# Patient Record
Sex: FEMALE | Employment: FULL TIME | URBAN - METROPOLITAN AREA
[De-identification: names, ages, dates, MRNs, and addresses within clinical notes are randomized per-mention and may not be internally consistent; named-entity substitution may affect disease eponyms.]

---

## 2022-06-24 ENCOUNTER — OFFICE VISIT (OUTPATIENT)
Dept: FAMILY MEDICINE CLINIC | Facility: CLINIC | Age: 37
End: 2022-06-24
Payer: COMMERCIAL

## 2022-06-24 VITALS
WEIGHT: 288 LBS | RESPIRATION RATE: 14 BRPM | OXYGEN SATURATION: 96 % | SYSTOLIC BLOOD PRESSURE: 138 MMHG | BODY MASS INDEX: 45.2 KG/M2 | HEIGHT: 67 IN | HEART RATE: 100 BPM | TEMPERATURE: 98 F | DIASTOLIC BLOOD PRESSURE: 96 MMHG

## 2022-06-24 DIAGNOSIS — J01.00 ACUTE NON-RECURRENT MAXILLARY SINUSITIS: Primary | ICD-10-CM

## 2022-06-24 DIAGNOSIS — R03.0 ELEVATED BLOOD PRESSURE READING: ICD-10-CM

## 2022-06-24 DIAGNOSIS — J02.9 SORE THROAT: ICD-10-CM

## 2022-06-24 PROCEDURE — 99202 OFFICE O/P NEW SF 15 MIN: CPT | Performed by: NURSE PRACTITIONER

## 2022-06-24 PROCEDURE — 3080F DIAST BP >= 90 MM HG: CPT | Performed by: NURSE PRACTITIONER

## 2022-06-24 PROCEDURE — 3008F BODY MASS INDEX DOCD: CPT | Performed by: NURSE PRACTITIONER

## 2022-06-24 PROCEDURE — 3075F SYST BP GE 130 - 139MM HG: CPT | Performed by: NURSE PRACTITIONER

## 2022-06-24 RX ORDER — CEFDINIR 300 MG/1
300 CAPSULE ORAL 2 TIMES DAILY
Qty: 20 CAPSULE | Refills: 0 | Status: SHIPPED | OUTPATIENT
Start: 2022-06-24 | End: 2022-07-04

## 2022-06-24 RX ORDER — BUPROPION HYDROCHLORIDE 150 MG/1
TABLET ORAL
COMMUNITY
Start: 2022-06-13

## 2022-06-24 RX ORDER — LISINOPRIL 20 MG/1
20 TABLET ORAL DAILY
COMMUNITY

## 2022-06-24 RX ORDER — AMOXICILLIN 875 MG/1
875 TABLET, COATED ORAL 2 TIMES DAILY
Qty: 20 TABLET | Refills: 0 | Status: SHIPPED | OUTPATIENT
Start: 2022-06-24 | End: 2022-06-24 | Stop reason: CLARIF

## 2023-09-07 ENCOUNTER — OFFICE VISIT (OUTPATIENT)
Dept: FAMILY MEDICINE CLINIC | Facility: CLINIC | Age: 38
End: 2023-09-07
Payer: COMMERCIAL

## 2023-09-07 VITALS
BODY MASS INDEX: 40.97 KG/M2 | OXYGEN SATURATION: 97 % | RESPIRATION RATE: 18 BRPM | HEART RATE: 107 BPM | TEMPERATURE: 99 F | DIASTOLIC BLOOD PRESSURE: 82 MMHG | HEIGHT: 67 IN | WEIGHT: 261 LBS | SYSTOLIC BLOOD PRESSURE: 122 MMHG

## 2023-09-07 DIAGNOSIS — R11.10 VOMITING, UNSPECIFIED VOMITING TYPE, UNSPECIFIED WHETHER NAUSEA PRESENT: ICD-10-CM

## 2023-09-07 DIAGNOSIS — J02.0 STREP PHARYNGITIS: ICD-10-CM

## 2023-09-07 DIAGNOSIS — J02.9 SORE THROAT: Primary | ICD-10-CM

## 2023-09-07 DIAGNOSIS — R19.7 DIARRHEA, UNSPECIFIED TYPE: ICD-10-CM

## 2023-09-07 LAB
CONTROL LINE PRESENT WITH A CLEAR BACKGROUND (YES/NO): YES YES/NO
KIT LOT #: NORMAL NUMERIC
STREP GRP A CUL-SCR: POSITIVE

## 2023-09-07 PROCEDURE — 3074F SYST BP LT 130 MM HG: CPT | Performed by: NURSE PRACTITIONER

## 2023-09-07 PROCEDURE — 3008F BODY MASS INDEX DOCD: CPT | Performed by: NURSE PRACTITIONER

## 2023-09-07 PROCEDURE — 87880 STREP A ASSAY W/OPTIC: CPT | Performed by: NURSE PRACTITIONER

## 2023-09-07 PROCEDURE — 99213 OFFICE O/P EST LOW 20 MIN: CPT | Performed by: NURSE PRACTITIONER

## 2023-09-07 PROCEDURE — 3079F DIAST BP 80-89 MM HG: CPT | Performed by: NURSE PRACTITIONER

## 2023-09-07 RX ORDER — ALPRAZOLAM 0.25 MG/1
TABLET ORAL
COMMUNITY
Start: 2013-07-18

## 2023-09-07 RX ORDER — METOPROLOL SUCCINATE 50 MG/1
TABLET, EXTENDED RELEASE ORAL
COMMUNITY
Start: 2013-07-18

## 2023-09-07 RX ORDER — ALBUTEROL SULFATE 90 UG/1
AEROSOL, METERED RESPIRATORY (INHALATION)
COMMUNITY
Start: 2023-07-26

## 2023-09-07 RX ORDER — LISINOPRIL AND HYDROCHLOROTHIAZIDE 20; 12.5 MG/1; MG/1
1 TABLET ORAL DAILY
COMMUNITY
Start: 2023-07-02

## 2023-09-07 RX ORDER — PHENTERMINE HYDROCHLORIDE 37.5 MG/1
37.5 CAPSULE ORAL EVERY MORNING
COMMUNITY
Start: 2023-07-26

## 2023-09-07 RX ORDER — AMOXICILLIN 875 MG/1
875 TABLET, COATED ORAL 2 TIMES DAILY
Qty: 20 TABLET | Refills: 0 | Status: SHIPPED | OUTPATIENT
Start: 2023-09-07 | End: 2023-09-17

## 2023-09-07 NOTE — PATIENT INSTRUCTIONS
Push fluids. Carroll diet. Antibiotic as prescribed, take with food. Consider taking a probiotic or eating yogurt to replace some of the good bacteria in the GI system. Follow up for new or worsening symptoms or if not improving the next 2 days.

## 2024-03-21 ENCOUNTER — OFFICE VISIT (OUTPATIENT)
Dept: FAMILY MEDICINE CLINIC | Facility: CLINIC | Age: 39
End: 2024-03-21
Payer: COMMERCIAL

## 2024-03-21 VITALS
SYSTOLIC BLOOD PRESSURE: 118 MMHG | HEIGHT: 68 IN | BODY MASS INDEX: 39.71 KG/M2 | DIASTOLIC BLOOD PRESSURE: 86 MMHG | HEART RATE: 83 BPM | RESPIRATION RATE: 16 BRPM | OXYGEN SATURATION: 99 % | WEIGHT: 262 LBS

## 2024-03-21 DIAGNOSIS — Z00.00 WELLNESS EXAMINATION: Primary | ICD-10-CM

## 2024-03-21 DIAGNOSIS — R40.0 SOMNOLENCE, DAYTIME: ICD-10-CM

## 2024-03-21 DIAGNOSIS — I10 ESSENTIAL HYPERTENSION: ICD-10-CM

## 2024-03-21 DIAGNOSIS — Z13.21 ENCOUNTER FOR VITAMIN DEFICIENCY SCREENING: ICD-10-CM

## 2024-03-21 DIAGNOSIS — Z00.00 LABORATORY EXAMINATION ORDERED AS PART OF A ROUTINE GENERAL MEDICAL EXAMINATION: ICD-10-CM

## 2024-03-21 DIAGNOSIS — F41.9 ANXIETY AND DEPRESSION: ICD-10-CM

## 2024-03-21 DIAGNOSIS — F32.A ANXIETY AND DEPRESSION: ICD-10-CM

## 2024-03-21 DIAGNOSIS — E66.01 CLASS 2 SEVERE OBESITY DUE TO EXCESS CALORIES WITH SERIOUS COMORBIDITY AND BODY MASS INDEX (BMI) OF 39.0 TO 39.9 IN ADULT (HCC): ICD-10-CM

## 2024-03-21 PROCEDURE — 3074F SYST BP LT 130 MM HG: CPT | Performed by: FAMILY MEDICINE

## 2024-03-21 PROCEDURE — 3079F DIAST BP 80-89 MM HG: CPT | Performed by: FAMILY MEDICINE

## 2024-03-21 PROCEDURE — 3008F BODY MASS INDEX DOCD: CPT | Performed by: FAMILY MEDICINE

## 2024-03-21 PROCEDURE — 99386 PREV VISIT NEW AGE 40-64: CPT | Performed by: FAMILY MEDICINE

## 2024-03-21 PROCEDURE — 99204 OFFICE O/P NEW MOD 45 MIN: CPT | Performed by: FAMILY MEDICINE

## 2024-03-21 RX ORDER — FLUTICASONE PROPIONATE 50 MCG
2 SPRAY, SUSPENSION (ML) NASAL AS NEEDED
COMMUNITY

## 2024-03-21 RX ORDER — PHENTERMINE HYDROCHLORIDE 15 MG/1
15 CAPSULE ORAL EVERY MORNING
Qty: 30 CAPSULE | Refills: 0 | Status: SHIPPED | OUTPATIENT
Start: 2024-03-21

## 2024-03-21 NOTE — PATIENT INSTRUCTIONS
Prevention Guidelines, Women Ages 40 to 49  Screening tests and vaccines are an important part of managing your health. A screening test is done to find diseases in people who don't have any symptoms. The goal is to find a disease early so lifestyle changes and checkups can reduce the risk of disease. Or the goal may be to detect it early to treat it most effectively. Screening tests are not used to diagnose a disease. But they are used to see if more testing is needed. Health counseling is important, too. Below are guidelines for these, for women ages 40 to 49. Talk with your healthcare provider to make sure you’re up-to-date on what you need.  Screening Who needs it How often   Type 2 diabetes or prediabetes All women beginning at age 40 and women without symptoms at any age who are overweight or obese and have 1 or more additional risk factors for diabetes At least every 3 years     Type 2 diabetes or prediabetes All women diagnosed with gestational diabetes Lifelong testing every 3 years   Type 2 diabetes All women with prediabetes Every year   Alcohol misuse All women in this age group At routine exams   Blood pressure All women in this age group Yearly checkup if your blood pressure is normal  Normal blood pressure is less than 120/80 mm Hg  If your blood pressure reading is higher than normal, follow the advice of your healthcare provider   Breast cancer All women at average risk in this age group Screening with a mammogram can start at age 40. Talk with your healthcare provider to help you decide when to start screening. At age 45 start yearly mammograms.     Cervical cancer All women in this age group, except women who have had a complete hysterectomy Pap test every 3 years or Pap test plus human papilloma virus (HPV) test every 5 years   Colorectal cancer Women age 45 years and older at average risk Multiple tests are available and are used at different times. Possible tests include:  Flexible  sigmoidoscopy every 5 years, or  Colonoscopy every 10 years, or  CT colonography (virtual colonoscopy) every 5 years, or  Yearly fecal occult blood test, or  Yearly fecal immunochemical test every year, or  Stool DNA test, every 3 years or  Double contrast barium enema every 5 years  If you choose a test other than a colonoscopy and have an abnormal test result, you will need to follow-up with a colonoscopy. Screening advice varies among expert groups. Talk with your healthcare provider about which tests are best for you.  Some people should be screened using a different schedule because of their personal or family health history. Talk with your healthcare provider about your health history.   Chlamydia Women at increased risk for infection At routine exams if you're at risk or have symptoms   Depression All women in this age group At routine exams   Gonorrhea Sexually active women at increased risk for infection At routine exams   Hepatitis C Anyone at increased risk; 1 time for those born between 1945 and 1965 At routine exams   High cholesterol or triglycerides All women ages 45 and older who are at risk for coronary artery disease; younger women, talk with your healthcare provider At least every 5 years   HIV All women At routine exams. Those with risk factors for HIV should be tested at least annually.   Obesity All women in this age group At routine exams   Syphilis Women at increased risk for infection: talk with your healthcare provider At routine exams   Tuberculosis Women at increased risk for infection Ask your healthcare provider   Vision All women in this age group Complete exam at age 40 and eye exams every 2 to 4 years. If you have a chronic disease, ask your healthcare provider how often you should have your eyes examined.   Vaccine Who needs it How often   Chickenpox (varicella) All women in this age group who have no record of this infection or vaccine 2 doses; the second dose should be given at  least 4 weeks after the first dose   Hepatitis A Women at increased risk for infection: talk with your healthcare provider 2 doses given 6 months apart   Hepatitis B Women at increased risk for infection: talk with your healthcare provider 3 doses over 6 months; second dose should be given 1 month after the first dose; the third dose should be given at least 2 months after the second dose and at least 4 months after the first dose   Haemophilus influenzae Type B (HIB) Women at increased risk 1 to 3 doses   Influenza (flu) All women in this age group Once a year   Measles, mumps, rubella (MMR) All women in this age group who have no record of these infections or vaccines 1 or 2 doses   Meningococcal Women at increased risk for infection: talk with your healthcare provider 1 or more doses   Pneumococcal conjugate vaccine (PCV13) and pneumococcal polysaccharide vaccine (PPSV23) Women at increased risk for infection: talk with your healthcare provider 1 or 2 doses   Tetanus/diphtheria/pertussis (Td/Tdap) booster All women in this age group A 1-time dose of Tdap instead of a Td booster after age 18, then Td every 10 years   Counseling Who needs it How often   BRCA gene mutation testing for breast and ovarian cancer susceptibility Women with increased risk for having gene mutation When your risk is known   Breast cancer and chemoprevention Women at high risk for breast cancer When your risk is known   Diet and exercise Women who are overweight or obese When diagnosed, and then at routine exams   Domestic violence Women at the age in which they are able to have children At routine exams   Sexually transmitted infection prevention Women at increased risk for infection-talk with your healthcare provider At routine exams   Use of tobacco and the health effects it can cause All women in this age group Every exam   Saleem last reviewed this educational content on 2/1/2023 © 2000-2023 [employer]. All rights  reserved. This information is not intended as a substitute for professional medical care. Always follow your healthcare professional's instructions.

## 2024-03-21 NOTE — PROGRESS NOTES
HPI:     Maria Isabel Carney is a 38 year old female who presents for an Annual Health Visit/establish care.     Has h/o HTN  controlled on  lisinopril-hydrochlorothiazide. No symptoms.     Has h/o anxiety/depression controlled on bupropion. No new concerns.     Obesity - was previously on phentermine; wanted to restart.     Daytime somnolence - for past few months has been feeling more fatigue, daytime sleepiness. Does snore. No witnessed apneic episodes. Sleep is refreshing. Follows stable sleep routine. Does report that when she was on phentermine this helped with symptoms.       Allergies:   No Known Allergies    CURRENT MEDICATIONS:   Current Outpatient Medications   Medication Sig Dispense Refill    fluticasone propionate 50 MCG/ACT Nasal Suspension 2 sprays by Nasal route as needed for Allergies or Rhinitis.      Phentermine HCl 15 MG Oral Cap Take 1 capsule (15 mg total) by mouth every morning. 30 capsule 0    albuterol 108 (90 Base) MCG/ACT Inhalation Aero Soln INHALE 2 PUFFS EVERY 4-6 HOURS BY INHALATION ROUTE AS NEEDED.      ALPRAZolam 0.25 MG Oral Tab Take by mouth.      lisinopril-hydroCHLOROthiazide 20-12.5 MG Oral Tab Take 1 tablet by mouth daily.      buPROPion  MG Oral Tablet 24 Hr         MEDICAL INFORMATION:   Past Medical History:   Diagnosis Date    Allergic rhinitis     Anxiety     Essential hypertension     Obesity       Past Surgical History:   Procedure Laterality Date       and     Twins and 1      2017      Family History   Problem Relation Age of Onset    Cancer Mother         myeloma    Cancer Maternal Grandmother         Throat cancer    Dementia Maternal Grandfather         Dementia passed in       SOCIAL HISTORY:   Social History     Socioeconomic History    Marital status: Single   Tobacco Use    Smoking status: Former     Types: Cigarettes     Quit date: 3/1/2016     Years since quittin.0    Smokeless tobacco: Never   Vaping Use    Vaping Use:  Never used   Substance and Sexual Activity    Alcohol use: Yes     Alcohol/week: 1.0 standard drink of alcohol     Types: 1 Cans of beer per week     Comment: socially    Drug use: Never   Other Topics Concern    Caffeine Concern Yes    Exercise No    Seat Belt Yes    Special Diet Yes    Stress Concern Yes    Weight Concern Yes     Social History     Social History Narrative    Not on file        REVIEW OF SYSTEMS:     Constitutional:  see HPI  Eyes: negative  ENT: negative  Respiratory: negative  Cardiovascular: negative  Gastrointestinal: negative  Integument/Breast: negative  Genitourinary: negative  Heme/Lymph: negative  Musculoskeletal: negative  Neurological: negative  Psych: negative  Endocrine: negative  Allergic/Immune: negative        EXAM:   /86   Pulse 83   Resp 16   Ht 5' 8\" (1.727 m)   Wt 262 lb (118.8 kg)   LMP 03/13/2024 (Exact Date)   SpO2 99%   BMI 39.84 kg/m²    Wt Readings from Last 6 Encounters:   03/21/24 262 lb (118.8 kg)   09/07/23 261 lb (118.4 kg)   06/24/22 288 lb (130.6 kg)     Body mass index is 39.84 kg/m².    General: alert, appears stated age, and cooperative  Head: Normocephalic, without obvious abnormality, atraumatic  Eyes: negative  Ears: normal TM's and external ear canals both ears  Nose: Nares normal. Septum midline. Mucosa normal. No drainage or sinus tenderness.  Throat: lips, mucosa, and tongue normal; teeth and gums normal  Neck: no adenopathy, supple, symmetrical, trachea midline, and thyroid not enlarged, symmetric, no tenderness/mass/nodules  Heart: S1, S2 normal, no murmur, click, rub or gallop, regular rate and rhythm  Lungs: clear to auscultation bilaterally  Breast:  deferred  Abdomen: soft, non-tender; bowel sounds normal; no masses,  no organomegaly  Pelvic: deferred  Back: negative  Extremities: extremities normal, atraumatic, no cyanosis or edema  Pulses: 2+ and symmetric  Skin: Skin color, texture, turgor normal. No rashes or lesions  Lymph Nodes:   No LAD  Neurologic: Grossly normal      ASSESSMENT AND PLAN:   Maria Isabel was seen today for [facility] care and well adult.    Diagnoses and all orders for this visit:    Wellness examination  -Immunizations: UTD   -Metabolic: BMI 39. BP wnl. Due for annual labs   -Cancer screening: need records for mammo/pap  -Communicable disease: low risk   -Mental health: no concerns   -Other preventative: follow with dentistry and optometry.   -Lifestyle: Follow a well balanced healthy diet with emphasis on fruits, vegetables, whole grains, lean meats. Limit processed and junk foods. Aim for at least 150 minutes of moderate intensity exercise weekly. Make sure you are staying adequately hydrated. Aim to get 7-9 hours of sleep nightly.     Laboratory examination ordered as part of a routine general medical examination  -     CBC With Differential With Platelet; Future  -     Comp Metabolic Panel (14); Future  -     Lipid Panel; Future  -     TSH W Reflex To Free T4; Future    Essential hypertension  Stable, CPM    Anxiety and depression  Stable, CPM    Encounter for vitamin deficiency screening  -     Vitamin B12; Future  -     Vitamin D [E]; Future    Somnolence, daytime  Concern for sleep apnea - will obtain sleep study.   -     Diagnostic Sleep Study-split night PAP implemented if criteria met  -     General sleep study; Future    Class 2 severe obesity due to excess calories with serious comorbidity and body mass index (BMI) of 39.0 to 39.9 in adult (HCC)  Restart  phentermine.   Continue diet, exercise recommendations.   Follow 150 min moderate intensity exercise, aim for 500cal daily calorie deficit.   Reviewed medication administration, side effects.   F/u 3mo for wt check.     -     Phentermine HCl 15 MG Oral Cap; Take 1 capsule (15 mg total) by mouth every morning.        Patient Instructions     Prevention Guidelines, Women Ages 40 to 49  Screening tests and vaccines are an important part of managing your health. A  screening test is done to find diseases in people who don't have any symptoms. The goal is to find a disease early so lifestyle changes and checkups can reduce the risk of disease. Or the goal may be to detect it early to treat it most effectively. Screening tests are not used to diagnose a disease. But they are used to see if more testing is needed. Health counseling is important, too. Below are guidelines for these, for women ages 40 to 49. Talk with your healthcare provider to make sure you’re up-to-date on what you need.  Screening Who needs it How often   Type 2 diabetes or prediabetes All women beginning at age 40 and women without symptoms at any age who are overweight or obese and have 1 or more additional risk factors for diabetes At least every 3 years     Type 2 diabetes or prediabetes All women diagnosed with gestational diabetes Lifelong testing every 3 years   Type 2 diabetes All women with prediabetes Every year   Alcohol misuse All women in this age group At routine exams   Blood pressure All women in this age group Yearly checkup if your blood pressure is normal  Normal blood pressure is less than 120/80 mm Hg  If your blood pressure reading is higher than normal, follow the advice of your healthcare provider   Breast cancer All women at average risk in this age group Screening with a mammogram can start at age 40. Talk with your healthcare provider to help you decide when to start screening. At age 45 start yearly mammograms.     Cervical cancer All women in this age group, except women who have had a complete hysterectomy Pap test every 3 years or Pap test plus human papilloma virus (HPV) test every 5 years   Colorectal cancer Women age 45 years and older at average risk Multiple tests are available and are used at different times. Possible tests include:  Flexible sigmoidoscopy every 5 years, or  Colonoscopy every 10 years, or  CT colonography (virtual colonoscopy) every 5 years, or  Yearly fecal  occult blood test, or  Yearly fecal immunochemical test every year, or  Stool DNA test, every 3 years or  Double contrast barium enema every 5 years  If you choose a test other than a colonoscopy and have an abnormal test result, you will need to follow-up with a colonoscopy. Screening advice varies among expert groups. Talk with your healthcare provider about which tests are best for you.  Some people should be screened using a different schedule because of their personal or family health history. Talk with your healthcare provider about your health history.   Chlamydia Women at increased risk for infection At routine exams if you're at risk or have symptoms   Depression All women in this age group At routine exams   Gonorrhea Sexually active women at increased risk for infection At routine exams   Hepatitis C Anyone at increased risk; 1 time for those born between 1945 and 1965 At routine exams   High cholesterol or triglycerides All women ages 45 and older who are at risk for coronary artery disease; younger women, talk with your healthcare provider At least every 5 years   HIV All women At routine exams. Those with risk factors for HIV should be tested at least annually.   Obesity All women in this age group At routine exams   Syphilis Women at increased risk for infection: talk with your healthcare provider At routine exams   Tuberculosis Women at increased risk for infection Ask your healthcare provider   Vision All women in this age group Complete exam at age 40 and eye exams every 2 to 4 years. If you have a chronic disease, ask your healthcare provider how often you should have your eyes examined.   Vaccine Who needs it How often   Chickenpox (varicella) All women in this age group who have no record of this infection or vaccine 2 doses; the second dose should be given at least 4 weeks after the first dose   Hepatitis A Women at increased risk for infection: talk with your healthcare provider 2 doses given  6 months apart   Hepatitis B Women at increased risk for infection: talk with your healthcare provider 3 doses over 6 months; second dose should be given 1 month after the first dose; the third dose should be given at least 2 months after the second dose and at least 4 months after the first dose   Haemophilus influenzae Type B (HIB) Women at increased risk 1 to 3 doses   Influenza (flu) All women in this age group Once a year   Measles, mumps, rubella (MMR) All women in this age group who have no record of these infections or vaccines 1 or 2 doses   Meningococcal Women at increased risk for infection: talk with your healthcare provider 1 or more doses   Pneumococcal conjugate vaccine (PCV13) and pneumococcal polysaccharide vaccine (PPSV23) Women at increased risk for infection: talk with your healthcare provider 1 or 2 doses   Tetanus/diphtheria/pertussis (Td/Tdap) booster All women in this age group A 1-time dose of Tdap instead of a Td booster after age 18, then Td every 10 years   Counseling Who needs it How often   BRCA gene mutation testing for breast and ovarian cancer susceptibility Women with increased risk for having gene mutation When your risk is known   Breast cancer and chemoprevention Women at high risk for breast cancer When your risk is known   Diet and exercise Women who are overweight or obese When diagnosed, and then at routine exams   Domestic violence Women at the age in which they are able to have children At routine exams   Sexually transmitted infection prevention Women at increased risk for infection-talk with your healthcare provider At routine exams   Use of tobacco and the health effects it can cause All women in this age group Every exam   Saleem last reviewed this educational content on 2/1/2023 © 2000-2023 [employer]. All rights reserved. This information is not intended as a substitute for professional medical care. Always follow your healthcare professional's  instructions.          The patient indicates understanding of these issues and agrees to the plan.    Problem List:  Patient Active Problem List   Diagnosis    Essential hypertension       Sathish Gaming MD  3/21/2024  2:24 PM

## 2024-03-23 ENCOUNTER — LAB ENCOUNTER (OUTPATIENT)
Dept: LAB | Age: 39
End: 2024-03-23
Attending: FAMILY MEDICINE
Payer: COMMERCIAL

## 2024-03-23 DIAGNOSIS — Z00.00 LABORATORY EXAMINATION ORDERED AS PART OF A ROUTINE GENERAL MEDICAL EXAMINATION: ICD-10-CM

## 2024-03-23 DIAGNOSIS — Z13.21 ENCOUNTER FOR VITAMIN DEFICIENCY SCREENING: ICD-10-CM

## 2024-03-23 LAB
ALBUMIN SERPL-MCNC: 3.6 G/DL (ref 3.4–5)
ALBUMIN/GLOB SERPL: 0.8 {RATIO} (ref 1–2)
ALP LIVER SERPL-CCNC: 70 U/L
ALT SERPL-CCNC: 35 U/L
ANION GAP SERPL CALC-SCNC: 3 MMOL/L (ref 0–18)
AST SERPL-CCNC: 18 U/L (ref 15–37)
BASOPHILS # BLD AUTO: 0.08 X10(3) UL (ref 0–0.2)
BASOPHILS NFR BLD AUTO: 0.9 %
BILIRUB SERPL-MCNC: 0.5 MG/DL (ref 0.1–2)
BUN BLD-MCNC: 13 MG/DL (ref 9–23)
CALCIUM BLD-MCNC: 9.5 MG/DL (ref 8.5–10.1)
CHLORIDE SERPL-SCNC: 103 MMOL/L (ref 98–112)
CHOLEST SERPL-MCNC: 172 MG/DL (ref ?–200)
CO2 SERPL-SCNC: 31 MMOL/L (ref 21–32)
CREAT BLD-MCNC: 1.43 MG/DL
EGFRCR SERPLBLD CKD-EPI 2021: 48 ML/MIN/1.73M2 (ref 60–?)
EOSINOPHIL # BLD AUTO: 0.26 X10(3) UL (ref 0–0.7)
EOSINOPHIL NFR BLD AUTO: 3 %
ERYTHROCYTE [DISTWIDTH] IN BLOOD BY AUTOMATED COUNT: 13.6 %
FASTING PATIENT LIPID ANSWER: YES
FASTING STATUS PATIENT QL REPORTED: YES
GLOBULIN PLAS-MCNC: 4.5 G/DL (ref 2.8–4.4)
GLUCOSE BLD-MCNC: 83 MG/DL (ref 70–99)
HCT VFR BLD AUTO: 40.1 %
HDLC SERPL-MCNC: 52 MG/DL (ref 40–59)
HGB BLD-MCNC: 12.9 G/DL
IMM GRANULOCYTES # BLD AUTO: 0.03 X10(3) UL (ref 0–1)
IMM GRANULOCYTES NFR BLD: 0.3 %
LDLC SERPL CALC-MCNC: 98 MG/DL (ref ?–100)
LYMPHOCYTES # BLD AUTO: 2.4 X10(3) UL (ref 1–4)
LYMPHOCYTES NFR BLD AUTO: 27.7 %
MCH RBC QN AUTO: 28.2 PG (ref 26–34)
MCHC RBC AUTO-ENTMCNC: 32.2 G/DL (ref 31–37)
MCV RBC AUTO: 87.7 FL
MONOCYTES # BLD AUTO: 0.65 X10(3) UL (ref 0.1–1)
MONOCYTES NFR BLD AUTO: 7.5 %
NEUTROPHILS # BLD AUTO: 5.25 X10 (3) UL (ref 1.5–7.7)
NEUTROPHILS # BLD AUTO: 5.25 X10(3) UL (ref 1.5–7.7)
NEUTROPHILS NFR BLD AUTO: 60.6 %
NONHDLC SERPL-MCNC: 120 MG/DL (ref ?–130)
OSMOLALITY SERPL CALC.SUM OF ELEC: 283 MOSM/KG (ref 275–295)
PLATELET # BLD AUTO: 264 10(3)UL (ref 150–450)
POTASSIUM SERPL-SCNC: 4 MMOL/L (ref 3.5–5.1)
PROT SERPL-MCNC: 8.1 G/DL (ref 6.4–8.2)
RBC # BLD AUTO: 4.57 X10(6)UL
SODIUM SERPL-SCNC: 137 MMOL/L (ref 136–145)
TRIGL SERPL-MCNC: 124 MG/DL (ref 30–149)
TSI SER-ACNC: 1.84 MIU/ML (ref 0.36–3.74)
VIT B12 SERPL-MCNC: 812 PG/ML (ref 193–986)
VIT D+METAB SERPL-MCNC: 40.4 NG/ML (ref 30–100)
VLDLC SERPL CALC-MCNC: 20 MG/DL (ref 0–30)
WBC # BLD AUTO: 8.7 X10(3) UL (ref 4–11)

## 2024-03-23 PROCEDURE — 80061 LIPID PANEL: CPT

## 2024-03-23 PROCEDURE — 80053 COMPREHEN METABOLIC PANEL: CPT

## 2024-03-23 PROCEDURE — 82306 VITAMIN D 25 HYDROXY: CPT

## 2024-03-23 PROCEDURE — 84443 ASSAY THYROID STIM HORMONE: CPT

## 2024-03-23 PROCEDURE — 82607 VITAMIN B-12: CPT

## 2024-03-23 PROCEDURE — 85025 COMPLETE CBC W/AUTO DIFF WBC: CPT

## 2024-03-27 DIAGNOSIS — R79.89 ELEVATED SERUM CREATININE: Primary | ICD-10-CM

## 2024-04-18 DIAGNOSIS — E66.01 CLASS 2 SEVERE OBESITY DUE TO EXCESS CALORIES WITH SERIOUS COMORBIDITY AND BODY MASS INDEX (BMI) OF 39.0 TO 39.9 IN ADULT (HCC): ICD-10-CM

## 2024-04-18 RX ORDER — PHENTERMINE HYDROCHLORIDE 37.5 MG/1
37.5 TABLET ORAL
Qty: 30 TABLET | Refills: 2 | Status: SHIPPED | OUTPATIENT
Start: 2024-04-18 | End: 2024-05-27

## 2024-04-18 RX ORDER — PHENTERMINE HYDROCHLORIDE 15 MG/1
15 CAPSULE ORAL EVERY MORNING
Qty: 30 CAPSULE | Refills: 0 | Status: CANCELLED | OUTPATIENT
Start: 2024-04-18

## 2024-04-18 NOTE — TELEPHONE ENCOUNTER
LF 3/21/2024 with 30 tabs + 0 refills   LOV 3/21/2024  RTC 3 months       Patient comment: I am looking to refill my prescription for Phentermine. I would say that it' s helping some but I know I would benefit more with the higher dose as I was at the highest dose with my prior doctor. I have lost 2.3 lbs since I started taking the medicine.

## 2024-04-19 ENCOUNTER — TELEPHONE (OUTPATIENT)
Dept: FAMILY MEDICINE CLINIC | Facility: CLINIC | Age: 39
End: 2024-04-19

## 2024-04-19 NOTE — TELEPHONE ENCOUNTER
Note from payer: Request Reference Number: [id number].  PHENTERMINE  TAB 37.5MG is denied due to Plan Exclusion.  For further questions, call [phone].  Payer: [employer] [facility] [employer] Case ID: [id number]  Electronic appeal: Not supported  Appeal instructions: Appeals are not supported through ePA. Please refer to the fax case notice for appeals information and instructions.  View History    Phentermine denied.  Can go thru [facility].  Mychart to pt

## 2024-04-24 ENCOUNTER — PATIENT MESSAGE (OUTPATIENT)
Dept: FAMILY MEDICINE CLINIC | Facility: CLINIC | Age: 39
End: 2024-04-24

## 2024-04-24 DIAGNOSIS — Z12.31 ENCOUNTER FOR SCREENING MAMMOGRAM FOR MALIGNANT NEOPLASM OF BREAST: Primary | ICD-10-CM

## 2024-04-25 NOTE — TELEPHONE ENCOUNTER
- Pt requesting mammogram be ordered by you so she can have it completed at [place] vs via Gyn. Okay to order?   Diagnostic in 2022 at [facility] w/ Rt brst US recommending routine screening. Verified w/ pt. No imaging since then.     - Pt requesting ObGyn referral w/in [place]. Okay to provide?  LOV 3/21/24

## 2024-04-25 NOTE — TELEPHONE ENCOUNTER
VM left for pt to inquire about date and location of last mammo. Was it screening vs diagnostic?     Pt requesting mammogram be ordered by you so she can have it completed at [facility] vs via [facility]yn. Okay to order? If so, screening v Diagnostic?     [facility] recommended routine screening.   Will call to ask if she has had any imaging done since these.     I would like to go for a mammogram through [facility] instead of my gynecologist. Looking for the referral to make the appointment

## 2024-04-26 ENCOUNTER — OFFICE VISIT (OUTPATIENT)
Dept: SLEEP CENTER | Age: 39
End: 2024-04-26
Attending: FAMILY MEDICINE
Payer: COMMERCIAL

## 2024-04-26 DIAGNOSIS — R40.0 SOMNOLENCE, DAYTIME: ICD-10-CM

## 2024-04-26 PROCEDURE — 95810 POLYSOM 6/> YRS 4/> PARAM: CPT

## 2024-04-27 ENCOUNTER — HOSPITAL ENCOUNTER (EMERGENCY)
Age: 39
Discharge: HOME | End: 2024-04-27
Attending: EMERGENCY MEDICINE
Payer: COMMERCIAL

## 2024-04-27 ENCOUNTER — APPOINTMENT (OUTPATIENT)
Dept: ULTRASOUND IMAGING | Age: 39
End: 2024-04-27
Attending: EMERGENCY MEDICINE
Payer: COMMERCIAL

## 2024-04-27 VITALS
TEMPERATURE: 99 F | OXYGEN SATURATION: 98 % | SYSTOLIC BLOOD PRESSURE: 128 MMHG | DIASTOLIC BLOOD PRESSURE: 79 MMHG | BODY MASS INDEX: 40.81 KG/M2 | HEART RATE: 102 BPM | HEIGHT: 67 IN | WEIGHT: 260 LBS | RESPIRATION RATE: 20 BRPM

## 2024-04-27 DIAGNOSIS — M79.605 PAIN IN BOTH LOWER EXTREMITIES: Primary | ICD-10-CM

## 2024-04-27 DIAGNOSIS — M79.604 PAIN IN BOTH LOWER EXTREMITIES: Primary | ICD-10-CM

## 2024-04-27 PROCEDURE — 93970 EXTREMITY STUDY: CPT | Performed by: EMERGENCY MEDICINE

## 2024-04-27 PROCEDURE — 99284 EMERGENCY DEPT VISIT MOD MDM: CPT

## 2024-04-28 NOTE — ED PROVIDER NOTES
Patient Seen in: [facility] In [place]      History     Chief Complaint   Patient presents with    Leg Pain     Stated Complaint: bilateral thigh pain radiating down into her feet since wednesday. States today*    Subjective:   HPI    Patient is a 39-year-old female presenting for evaluation of bilateral lower extremity pain.  The pain in the left leg seems to start in her left inner thigh and radiates down to the calf, on the right side is just sort of down in the mid and right lower medial calf.  Both areas of pain started 3 days ago.  No trauma, injury, new activity of any kind.  Constant since although worse with movement and getting around.  No swelling that she has noticed.  She is concerned as she has a history of superficial thrombophlebitis although no history of DVT as far as she knows.  No other symptoms or complaints.    Objective:   Past Medical History:    Allergic rhinitis    Anxiety    Essential hypertension    Obesity              Past Surgical History:   Procedure Laterality Date       and     Twins and 1      2017                Social History     Socioeconomic History    Marital status: Single   Tobacco Use    Smoking status: Former     Current packs/day: 0.00     Types: Cigarettes     Quit date: 3/1/2016     Years since quittin.1    Smokeless tobacco: Never   Vaping Use    Vaping status: Never Used   Substance and Sexual Activity    Alcohol use: Yes     Alcohol/week: 1.0 standard drink of alcohol     Types: 1 Cans of beer per week     Comment: socially    Drug use: Never   Other Topics Concern    Caffeine Concern Yes    Exercise No    Seat Belt Yes    Special Diet Yes    Stress Concern Yes    Weight Concern Yes     Social Determinants of Health      Received from [facility]              Review of Systems    Positive for stated complaint: bilateral thigh pain radiating down into her feet since wednesday. States today*  Other  systems are as noted in HPI.  Constitutional and vital signs reviewed.      All other systems reviewed and negative except as noted above.    Physical Exam     ED Triage Vitals [04/27/24 1920]   /81   Pulse 116   Resp 20   Temp 98.6 °F (37 °C)   Temp src Oral   SpO2 97 %   O2 Device None (Room air)       Current:/81   Pulse 116   Temp 98.6 °F (37 °C) (Oral)   Resp 20   Ht 170.2 cm (5' 7\")   Wt 117.9 kg   LMP 04/06/2024 (Approximate)   SpO2 97%   BMI 40.72 kg/m²         Physical Exam  Vitals and nursing note reviewed.   Constitutional:       Appearance: She is well-developed.   HENT:      Head: Normocephalic and atraumatic.   Eyes:      Conjunctiva/sclera: Conjunctivae normal.      Pupils: Pupils are equal, round, and reactive to light.   Cardiovascular:      Rate and Rhythm: Normal rate and regular rhythm.      Heart sounds: Normal heart sounds.   Pulmonary:      Effort: Pulmonary effort is normal.      Breath sounds: Normal breath sounds.   Abdominal:      General: Bowel sounds are normal.      Palpations: Abdomen is soft.   Musculoskeletal:         General: Normal range of motion.      Cervical back: Normal range of motion and neck supple.      Comments: No significant reproducible tenderness in either lower extremity.  There is no edema.  Compartments are soft.  Distal pulses are good.  No erythema or increased warmth.   Skin:     General: Skin is warm and dry.   Neurological:      Mental Status: She is alert and oriented to person, place, and time.               ED Course   Labs Reviewed - No data to display          US VENOUS DOPPLER LEG BILAT - DIAG IMG (CPT=93970)    Result Date: 4/27/2024  PROCEDURE:  US VENOUS DOPPLER LEG BILAT - DIAG IMG (CPT=93970)  COMPARISON:  None.  INDICATIONS:  eval for DVT  TECHNIQUE:  Real time, grey scale, and duplex ultrasound was used to evaluate the lower extremity venous system. B-mode two-dimensional images of the vascular structures, Doppler spectral  analysis, and color flow.  Doppler imaging were performed.  The following veins were imaged bilaterally:  Common, deep, and superficial femoral, popliteal, sapheno-femoral junction, and posterior tibial veins.  PATIENT STATED HISTORY: (As transcribed by Technologist)  Patient stated leg pain from the thighs down to both legs, pain for three days.    FINDINGS:  SAPHENOFEMORAL JUNCTION:  No reflux. THROMBI:  None visible. COMPRESSION:  Normal compressibility, phasicity, and augmentation. OTHER:  Negative.            CONCLUSION:  No evidence of DVT in bilateral lower extremities.   LOCATION:  [facility]   Dictated by (CST): Raj Back MD on 4/27/2024 at 9:26 PM     Finalized by (CST): Raj Back MD on 4/27/2024 at 9:26 PM               MDM      Pleasant 39-year-old female presenting for evaluation of bilateral leg pain.  Left is more in the thigh down to the ankle, right is more in the calf.  Atraumatic.  Worse when getting around.  There is no edema on exam and her compartments are soft, distal pulses are good.  Will check venous Dopplers to rule out deep thrombus versus superficial thrombophlebitis.      Update at 9:30 PM.  Venous Dopplers negative for DVT or superficial thrombophlebitis.  Discussed results with patient.  She can be discharged.        Past Medical History-hypertension    Differential diagnosis before testing included DVT, superficial thrombophlebitis, muscle spasm    Co-morbidities that add to the complexity of management include: None    Testing ordered during this visit included venous Doppler    Radiographic images  I personally reviewed the radiographs and my individual interpretation shows no DVT or superficial thrombophlebitis  I also reviewed the official reports that showed no DVT or superficial thrombophlebitis            Disposition:          Discharge  I have discussed with the patient the results of test, differential diagnosis, treatment plan, warning signs and symptoms which  should prompt immediate return.  They expressed understanding of these instructions and agrees to the following plan provided.  They were given written discharge instructions and agrees to return for any concerns and voiced understanding and all questions were answered.                           Medical Decision Making      Disposition and Plan     Clinical Impression:  1. Pain in both lower extremities         Disposition:  Discharge  4/27/2024  9:37 pm    Follow-up:  Sathish Gaming MD  [id number] [place] [id number]  [phone]    Follow up            Medications Prescribed:  Current Discharge Medication List

## 2024-04-28 NOTE — ED INITIAL ASSESSMENT (HPI)
Left leg pain from upper thigh radiating down to foot. Also reports right leg pain from knee down since Wend.  Denies falls/trauma, does have a hx of superficial veins and blood clots. Denies paraesthesias.

## 2024-05-02 ENCOUNTER — SLEEP STUDY (OUTPATIENT)
Facility: CLINIC | Age: 39
End: 2024-05-02
Payer: COMMERCIAL

## 2024-05-02 DIAGNOSIS — G47.33 OBSTRUCTIVE SLEEP APNEA SYNDROME: Primary | ICD-10-CM

## 2024-05-02 PROCEDURE — 95810 POLYSOM 6/> YRS 4/> PARAM: CPT | Performed by: OTHER

## 2024-05-06 ENCOUNTER — PATIENT MESSAGE (OUTPATIENT)
Dept: FAMILY MEDICINE CLINIC | Facility: CLINIC | Age: 39
End: 2024-05-06

## 2024-05-06 ENCOUNTER — OFFICE VISIT (OUTPATIENT)
Dept: FAMILY MEDICINE CLINIC | Facility: CLINIC | Age: 39
End: 2024-05-06
Payer: COMMERCIAL

## 2024-05-06 VITALS
WEIGHT: 262 LBS | SYSTOLIC BLOOD PRESSURE: 126 MMHG | OXYGEN SATURATION: 98 % | TEMPERATURE: 98 F | BODY MASS INDEX: 41.12 KG/M2 | RESPIRATION RATE: 18 BRPM | HEART RATE: 88 BPM | DIASTOLIC BLOOD PRESSURE: 80 MMHG | HEIGHT: 67 IN

## 2024-05-06 DIAGNOSIS — J04.0 LARYNGITIS: Primary | ICD-10-CM

## 2024-05-06 DIAGNOSIS — G47.30 SLEEP APNEA, UNSPECIFIED TYPE: Primary | ICD-10-CM

## 2024-05-06 DIAGNOSIS — R05.1 ACUTE COUGH: ICD-10-CM

## 2024-05-06 PROCEDURE — 3074F SYST BP LT 130 MM HG: CPT | Performed by: NURSE PRACTITIONER

## 2024-05-06 PROCEDURE — 3079F DIAST BP 80-89 MM HG: CPT | Performed by: NURSE PRACTITIONER

## 2024-05-06 PROCEDURE — 3008F BODY MASS INDEX DOCD: CPT | Performed by: NURSE PRACTITIONER

## 2024-05-06 PROCEDURE — 99213 OFFICE O/P EST LOW 20 MIN: CPT | Performed by: NURSE PRACTITIONER

## 2024-05-06 RX ORDER — PREDNISONE 20 MG/1
40 TABLET ORAL DAILY
Qty: 6 TABLET | Refills: 0 | Status: SHIPPED | OUTPATIENT
Start: 2024-05-06 | End: 2024-05-09

## 2024-05-06 NOTE — PROGRESS NOTES
CHIEF COMPLAINT:     Chief Complaint   Patient presents with    Sore Throat     I lost my voice Friday and it's gotten worse since losing it. Have sore throat and cough. - Entered by patient  Wet cough for 4 days.  Lost of voice getting worst for 3 days.  OCT meds taken.        HPI:   Maria Isabel Carney is a 39 year old female who presents for upper respiratory symptoms for  4 days. Patient reports wet cough, loss of voice, PND.  Symptoms have been worsening since onset.  Treating symptoms with OTC meds with no relief .   Associated symptoms include sore throat with coughing, no significant sore throat at rest. Reports does not feel much congestion nasally. No fevers. No wheezing/breathing problems.     Current Outpatient Medications   Medication Sig Dispense Refill    predniSONE 20 MG Oral Tab Take 2 tablets (40 mg total) by mouth daily for 3 days. 6 tablet 0    Phentermine HCl 37.5 MG Oral Tab Take 1 tablet (37.5 mg total) by mouth every morning before breakfast. 30 tablet 2    fluticasone propionate 50 MCG/ACT Nasal Suspension 2 sprays by Nasal route as needed for Allergies or Rhinitis.      albuterol 108 (90 Base) MCG/ACT Inhalation Aero Soln INHALE 2 PUFFS EVERY 4-6 HOURS BY INHALATION ROUTE AS NEEDED.      ALPRAZolam 0.25 MG Oral Tab Take by mouth.      lisinopril-hydroCHLOROthiazide 20-12.5 MG Oral Tab Take 1 tablet by mouth daily.      buPROPion  MG Oral Tablet 24 Hr         Past Medical History:    Allergic rhinitis    Anxiety    Essential hypertension    Obesity      Past Surgical History:   Procedure Laterality Date       and     Twins and 1      2017         Social History     Socioeconomic History    Marital status: Single   Tobacco Use    Smoking status: Former     Current packs/day: 0.00     Types: Cigarettes     Quit date: 3/1/2016     Years since quittin.1    Smokeless tobacco: Never   Vaping Use    Vaping status: Never Used   Substance and Sexual Activity    Alcohol  use: Yes     Alcohol/week: 1.0 standard drink of alcohol     Types: 1 Cans of beer per week     Comment: socially    Drug use: Never   Other Topics Concern    Caffeine Concern Yes    Exercise No    Seat Belt Yes    Special Diet Yes    Stress Concern Yes    Weight Concern Yes     Social Determinants of Health      Received from [facility]         REVIEW OF SYSTEMS:   GENERAL: feels well otherwise,   ok appetite  SKIN: no rashes or abnormal skin lesions  HEENT: See HPI  LUNGS: denies shortness of breath or wheezing, See HPI  CARDIOVASCULAR: denies chest pain or palpitations   GI: denies N/V/C or abdominal pain  NEURO: Denies headaches    EXAM:   /80   Pulse 88   Temp 98 °F (36.7 °C) (Oral)   Resp 18   Ht 5' 7\" (1.702 m)   Wt 262 lb (118.8 kg)   LMP 05/05/2024 (Exact Date)   SpO2 98%   BMI 41.04 kg/m²   GENERAL: well developed, well nourished,in no apparent distress  SKIN: no rashes,no suspicious lesions  HEAD: atraumatic, normocephalic.  no tenderness on palpation of maxillary or frontal sinuses  EYES: conjunctiva clear, EOM intact  EARS: TM's pearly, no bulging, no retraction,no fluid, bony landmarks visualized  NOSE: Nostrils patent, clear nasal discharge, nasal mucosa pink and moist  THROAT: Oral mucosa pink, moist. Posterior pharynx is not erythematous. no exudates. Tonsils 2/4.    NECK: Supple, non-tender  LUNGS: clear to auscultation bilaterally, no wheezes or rhonchi.  No crackles/rales, good air movement throughout. Breathing is non labored.  CARDIO: RRR without murmur  EXTREMITIES: no cyanosis, clubbing or edema  LYMPH:  no cervical lymphadenopathy.        ASSESSMENT AND PLAN:   Maria Isabel Carney is a 39 year old female who presents with     ASSESSMENT:   Encounter Diagnoses   Name Primary?    Laryngitis Yes    Acute cough        PLAN: Meds as below.  Mucinex to help thin secretions.    Follow up with PCP if no improvement in 3-5 days, sooner if worsening.  If any sob/wheezing  seek emergent care.Comfort care as described in Patient Instructions    Meds & Refills for this Visit:  Requested Prescriptions     Signed Prescriptions Disp Refills    predniSONE 20 MG Oral Tab 6 tablet 0     Sig: Take 2 tablets (40 mg total) by mouth daily for 3 days.       Risks, benefits, and side effects of medication explained and discussed.    There are no Patient Instructions on file for this visit.    The patient indicates understanding of these issues and agrees to the plan.  The patient is asked to return if sx's persist or worsen.

## 2024-05-07 NOTE — TELEPHONE ENCOUNTER
From: Maria Isabel Carney  To: Sathish Gaming  Sent: 5/6/2024 9:35 AM CDT  Subject: ???    I was advised to follow up with sleep study for further treatment. I don't know how to go about that as the order came from Dr. Gaming and all I did was make an appointment with a center online. Who do I contact?

## 2024-05-10 ENCOUNTER — TELEPHONE (OUTPATIENT)
Facility: CLINIC | Age: 39
End: 2024-05-10

## 2024-05-10 DIAGNOSIS — G47.33 OSA (OBSTRUCTIVE SLEEP APNEA): Primary | ICD-10-CM

## 2024-05-23 ENCOUNTER — OFFICE VISIT (OUTPATIENT)
Dept: FAMILY MEDICINE CLINIC | Facility: CLINIC | Age: 39
End: 2024-05-23

## 2024-05-23 VITALS
WEIGHT: 262 LBS | RESPIRATION RATE: 18 BRPM | SYSTOLIC BLOOD PRESSURE: 122 MMHG | TEMPERATURE: 97 F | HEIGHT: 67 IN | HEART RATE: 99 BPM | BODY MASS INDEX: 41.12 KG/M2 | DIASTOLIC BLOOD PRESSURE: 82 MMHG | OXYGEN SATURATION: 99 %

## 2024-05-23 DIAGNOSIS — R05.8 COUGH WITH SPUTUM: Primary | ICD-10-CM

## 2024-05-23 DIAGNOSIS — J06.9 UPPER RESPIRATORY TRACT INFECTION, UNSPECIFIED TYPE: ICD-10-CM

## 2024-05-23 DIAGNOSIS — R09.82 POST-NASAL DRAINAGE: ICD-10-CM

## 2024-05-23 PROCEDURE — 3074F SYST BP LT 130 MM HG: CPT | Performed by: NURSE PRACTITIONER

## 2024-05-23 PROCEDURE — 3079F DIAST BP 80-89 MM HG: CPT | Performed by: NURSE PRACTITIONER

## 2024-05-23 PROCEDURE — 99213 OFFICE O/P EST LOW 20 MIN: CPT | Performed by: NURSE PRACTITIONER

## 2024-05-23 PROCEDURE — 3008F BODY MASS INDEX DOCD: CPT | Performed by: NURSE PRACTITIONER

## 2024-05-23 RX ORDER — ALBUTEROL SULFATE 90 UG/1
2 AEROSOL, METERED RESPIRATORY (INHALATION) EVERY 4 HOURS PRN
Qty: 1 EACH | Refills: 0 | Status: SHIPPED | OUTPATIENT
Start: 2024-05-23

## 2024-05-23 RX ORDER — AMOXICILLIN AND CLAVULANATE POTASSIUM 875; 125 MG/1; MG/1
1 TABLET, FILM COATED ORAL 2 TIMES DAILY
Qty: 20 TABLET | Refills: 0 | Status: SHIPPED | OUTPATIENT
Start: 2024-05-23 | End: 2024-06-02

## 2024-05-23 NOTE — PROGRESS NOTES
<p>Prior to commencing surgery patient identification, surgical procedure, site, and side were confirmed by Dr. Felisha [facility]. Following topical proparacaine anesthesia, the patient was positioned at the YAG laser, a contact lens coupled to the cornea with methylcellulose and an axial posterior capsulotomy performed without complication using 3.4 Mj x 19. Excess methylcellulose was washed from the eye, one drop of Alphagan was instilled and the patient returned to the holding area having tolerated the procedure well and without complication. </p><p>MRN [id number]</p> CHIEF COMPLAINT:     Chief Complaint   Patient presents with    Cough     X Tuesday w SOB, chest pain, upper back pain, chest congestion, cough   OTC Vicks, Mucinex        HPI:   Maria Isabel Carney is a 39 year old female who presents for upper respiratory symptoms for  2 days. Pt. Also seen 2.5 weeks ago for cough and laryngitis, reports felt better about 5 days after visit, was feeling well last week, then this past Tuesday, symptoms returned. States nasal congestion, chest congestion. Reports upper back ache which improves with massage. Reports feels some SOB with exertion such as going up stairs, but none at rest.  Patient reports scratchy throat with post nasal drainage. Symptoms have been consistent since onset.  Treating symptoms with vicks, mucinex.   Associated symptoms include fatigue.  Reports felt some warmth but no fevers. Denies hx of asthma, but is former smoker.     Current Outpatient Medications   Medication Sig Dispense Refill    albuterol (PROAIR HFA) 108 (90 Base) MCG/ACT Inhalation Aero Soln Inhale 2 puffs into the lungs every 4 (four) hours as needed. 1 each 0    amoxicillin clavulanate 875-125 MG Oral Tab Take 1 tablet by mouth 2 (two) times daily for 10 days. 20 tablet 0    Phentermine HCl 37.5 MG Oral Tab Take 1 tablet (37.5 mg total) by mouth every morning before breakfast. 30 tablet 2    fluticasone propionate 50 MCG/ACT Nasal Suspension 2 sprays by Nasal route as needed for Allergies or Rhinitis.      albuterol 108 (90 Base) MCG/ACT Inhalation Aero Soln INHALE 2 PUFFS EVERY 4-6 HOURS BY INHALATION ROUTE AS NEEDED.      ALPRAZolam 0.25 MG Oral Tab Take by mouth.      lisinopril-hydroCHLOROthiazide 20-12.5 MG Oral Tab Take 1 tablet by mouth daily.      buPROPion  MG Oral Tablet 24 Hr         Past Medical History:    Allergic rhinitis    Anxiety    Essential hypertension    Obesity      Past Surgical History:   Procedure Laterality Date       and     Twins and 1       2017         Social History     Socioeconomic History    Marital status: Single   Tobacco Use    Smoking status: Former     Current packs/day: 0.00     Types: Cigarettes     Quit date: 3/1/2016     Years since quittin.2    Smokeless tobacco: Never   Vaping Use    Vaping status: Never Used   Substance and Sexual Activity    Alcohol use: Yes     Alcohol/week: 1.0 standard drink of alcohol     Types: 1 Cans of beer per week     Comment: socially    Drug use: Never   Other Topics Concern    Caffeine Concern Yes    Exercise No    Seat Belt Yes    Special Diet Yes    Stress Concern Yes    Weight Concern Yes     Social Determinants of Health      Received from [facility]         REVIEW OF SYSTEMS:   GENERAL: feels well otherwise,   ok appetite  SKIN: no rashes or abnormal skin lesions  HEENT: See HPI  LUNGS: See HPI  CARDIOVASCULAR: denies chest pain or palpitations   GI: denies N/V/C or abdominal pain  NEURO: Denies headaches    EXAM:   /82   Pulse 99   Temp 97.2 °F (36.2 °C)   Resp 18   Ht 5' 7\" (1.702 m)   Wt 262 lb (118.8 kg)   LMP 2024 (Exact Date)   SpO2 99%   BMI 41.04 kg/m²   GENERAL: well developed, well nourished,in no apparent distress  SKIN: no rashes,no suspicious lesions  HEAD: atraumatic, normocephalic.  no tenderness on palpation of maxillary or frontal sinuses  EYES: conjunctiva clear, EOM intact  EARS: TM's pearly, no bulging, no retraction,no fluid, bony landmarks visualized  NOSE: Nostrils patent, mucoid nasal discharge, nasal mucosa erythematous  THROAT: Oral mucosa pink, moist. Posterior pharynx is mildly erythematous. no exudates. Tonsils 1/4.    NECK: Supple, non-tender  LUNGS: clear to auscultation bilaterally, no wheezes or rhonchi.  Occasional deep cough during exam.  No crackles/rales, good air movement throughout. Breathing is non labored.  CARDIO: RRR without murmur  EXTREMITIES: no cyanosis, clubbing or edema  LYMPH:  No cervical lymphadenopathy.         ASSESSMENT AND PLAN:   Maria Isabel Carney is a 39 year old female who presents with     ASSESSMENT:   Encounter Diagnoses   Name Primary?    Cough with sputum Yes    Post-nasal drainage     Upper respiratory tract infection, unspecified type        PLAN: Likely new viral URI.  Watch and wait for 3-4 days, if symptoms worsening stat abx. .  D/W Mucinex to help thin secretions.    Follow up with PCP if no improvement in 3-5 days, sooner if worsening.  If any sob/wheezing seek emergent care.Comfort care as described in Patient Instructions    Meds & Refills for this Visit:  Requested Prescriptions     Signed Prescriptions Disp Refills    albuterol (PROAIR HFA) 108 (90 Base) MCG/ACT Inhalation Aero Soln 1 each 0     Sig: Inhale 2 puffs into the lungs every 4 (four) hours as needed.    amoxicillin clavulanate 875-125 MG Oral Tab 20 tablet 0     Sig: Take 1 tablet by mouth 2 (two) times daily for 10 days.       Risks, benefits, and side effects of medication explained and discussed.    There are no Patient Instructions on file for this visit.    The patient indicates understanding of these issues and agrees to the plan.  The patient is asked to return if sx's persist or worsen.

## 2024-05-27 DIAGNOSIS — E66.01 CLASS 2 SEVERE OBESITY DUE TO EXCESS CALORIES WITH SERIOUS COMORBIDITY AND BODY MASS INDEX (BMI) OF 39.0 TO 39.9 IN ADULT (HCC): ICD-10-CM

## 2024-05-28 RX ORDER — PHENTERMINE HYDROCHLORIDE 37.5 MG/1
37.5 TABLET ORAL
Qty: 30 TABLET | Refills: 2 | Status: SHIPPED | OUTPATIENT
Start: 2024-05-28

## 2024-05-28 NOTE — TELEPHONE ENCOUNTER
Medication(s) to Refill:   Requested Prescriptions     Pending Prescriptions Disp Refills    Phentermine HCl 37.5 MG Oral Tab 30 tablet 2     Sig: Take 1 tablet (37.5 mg total) by mouth every morning before breakfast.         Reason for Medication Refill being sent to Provider / Reason Protocol Failed:  [] 90 day refill has already been granted  [] Blood Pressure out of range  [] Labs Abnormal/over due  [] Medication not previously prescribed by Provider  [] Non-Protocol Medication  [] Controlled Substance   [] Due for appointment- no future appointment scheduled  [] No Follow up specified      Last Time Medication was Filled:  4/18/24      Last Office Visit with PCP: 3/21/24    When Patient was Due Back to the Office:  3 months  (from when PCP last addressed condition)    Future Appointments:  Future Appointments   Date Time Provider Department [place]   6/8/2024  7:00 AM PFS T[facility] [address] [place]   6/26/2024  3:00 PM Sathish Gaming MD [facility]   7/17/2024 11:30 AM Chevy Renteria MD [facility] Pulm [facility] [place]         Last Blood Pressures:  BP Readings from Last 2 Encounters:   05/23/24 122/82   05/06/24 126/80         Action taken:  [] Refill approved per protocol  [] Routing to provider for approval

## 2024-07-26 ENCOUNTER — OFFICE VISIT (OUTPATIENT)
Dept: FAMILY MEDICINE CLINIC | Facility: CLINIC | Age: 39
End: 2024-07-26
Payer: COMMERCIAL

## 2024-07-26 VITALS
HEART RATE: 71 BPM | SYSTOLIC BLOOD PRESSURE: 122 MMHG | WEIGHT: 272 LBS | DIASTOLIC BLOOD PRESSURE: 84 MMHG | OXYGEN SATURATION: 98 % | HEIGHT: 67 IN | BODY MASS INDEX: 42.69 KG/M2

## 2024-07-26 DIAGNOSIS — I10 ESSENTIAL HYPERTENSION: Primary | ICD-10-CM

## 2024-07-26 DIAGNOSIS — E66.01 CLASS 3 SEVERE OBESITY DUE TO EXCESS CALORIES WITH SERIOUS COMORBIDITY AND BODY MASS INDEX (BMI) OF 40.0 TO 44.9 IN ADULT (HCC): ICD-10-CM

## 2024-07-26 DIAGNOSIS — G47.33 OBSTRUCTIVE SLEEP APNEA SYNDROME: ICD-10-CM

## 2024-07-26 DIAGNOSIS — F32.A ANXIETY AND DEPRESSION: ICD-10-CM

## 2024-07-26 DIAGNOSIS — F41.9 ANXIETY AND DEPRESSION: ICD-10-CM

## 2024-07-26 PROCEDURE — 3079F DIAST BP 80-89 MM HG: CPT | Performed by: FAMILY MEDICINE

## 2024-07-26 PROCEDURE — 99214 OFFICE O/P EST MOD 30 MIN: CPT | Performed by: FAMILY MEDICINE

## 2024-07-26 PROCEDURE — 3008F BODY MASS INDEX DOCD: CPT | Performed by: FAMILY MEDICINE

## 2024-07-26 PROCEDURE — 3074F SYST BP LT 130 MM HG: CPT | Performed by: FAMILY MEDICINE

## 2024-07-26 RX ORDER — BUPROPION HYDROCHLORIDE 150 MG/1
TABLET ORAL
Refills: 0 | Status: CANCELLED | OUTPATIENT
Start: 2024-07-26

## 2024-07-26 RX ORDER — LISINOPRIL AND HYDROCHLOROTHIAZIDE 20; 12.5 MG/1; MG/1
1 TABLET ORAL DAILY
Qty: 90 TABLET | Refills: 1 | Status: SHIPPED | OUTPATIENT
Start: 2024-07-26

## 2024-07-26 RX ORDER — PHENTERMINE HYDROCHLORIDE 37.5 MG/1
37.5 TABLET ORAL
Qty: 30 TABLET | Refills: 2 | Status: SHIPPED | OUTPATIENT
Start: 2024-07-26

## 2024-07-26 RX ORDER — BUPROPION HYDROCHLORIDE 300 MG/1
300 TABLET ORAL DAILY
Qty: 90 TABLET | Refills: 1 | Status: SHIPPED | OUTPATIENT
Start: 2024-07-26

## 2024-07-26 NOTE — PROGRESS NOTES
[facility] Family Medicine Office Note  Chief Complaint:   Chief Complaint   Patient presents with    Follow - Up     Follow up o blood work and sleep study        HPI:   This is a 39 year old female coming in for    Sleep apnea - moderate/severe. Has seen [facility] sleep clinic. Has been on CPAP, tolerating.     HTN - compliant with medications.     Obesity - didn't see much different since restarting phentermine (has been increased to maximum dosage) however she does report depression uncontrolled. Reports her job was big factor and recently layed off. She is on wellbutrin. No HI/SI. Has good support system at home.     Past Medical History:    Allergic rhinitis    Anxiety    Essential hypertension    Obesity     Past Surgical History:   Procedure Laterality Date       and     Twins and 1      2017     Social History:  Social History     Socioeconomic History    Marital status: Single   Tobacco Use    Smoking status: Former     Current packs/day: 0.00     Types: Cigarettes     Quit date: 3/1/2016     Years since quittin.4    Smokeless tobacco: Never   Vaping Use    Vaping status: Never Used   Substance and Sexual Activity    Alcohol use: Yes     Alcohol/week: 1.0 standard drink of alcohol     Types: 1 Cans of beer per week     Comment: socially    Drug use: Never   Other Topics Concern    Caffeine Concern Yes    Exercise No    Seat Belt Yes    Special Diet Yes    Stress Concern Yes    Weight Concern Yes     Social Determinants of Health      Received from [facility]     Family History:  Family History   Problem Relation Age of Onset    Cancer Mother         myeloma    Cancer Maternal Grandmother         Throat cancer    Dementia Maternal Grandfather         Dementia passed in      Allergies:  No Known Allergies  Current Meds:  Current Outpatient Medications   Medication Sig Dispense Refill    lisinopril-hydroCHLOROthiazide 20-12.5 MG Oral Tab Take 1 tablet by  mouth daily. 90 tablet 1    buPROPion  MG Oral Tablet 24 Hr Take 1 tablet (300 mg total) by mouth daily. 90 tablet 1    Phentermine HCl 37.5 MG Oral Tab Take 1 tablet (37.5 mg total) by mouth every morning before breakfast. 30 tablet 2    albuterol (PROAIR HFA) 108 (90 Base) MCG/ACT Inhalation Aero Soln Inhale 2 puffs into the lungs every 4 (four) hours as needed. 1 each 0    fluticasone propionate 50 MCG/ACT Nasal Suspension 2 sprays by Nasal route as needed for Allergies or Rhinitis.      albuterol 108 (90 Base) MCG/ACT Inhalation Aero Soln INHALE 2 PUFFS EVERY 4-6 HOURS BY INHALATION ROUTE AS NEEDED.      ALPRAZolam 0.25 MG Oral Tab Take by mouth.        Counseling given: Not Answered       REVIEW OF SYSTEMS:   Review of Systems   A comprehensive 10 point review of systems was completed.  Pertinent positives and negatives noted in the the HPI.    EXAM:   /84   Pulse 71   Ht 5' 7\" (1.702 m)   Wt 272 lb (123.4 kg)   LMP 07/01/2024 (Exact Date)   SpO2 98%   BMI 42.60 kg/m²  Estimated body mass index is 42.6 kg/m² as calculated from the following:    Height as of this encounter: 5' 7\" (1.702 m).    Weight as of this encounter: 272 lb (123.4 kg).   Vital signs reviewed.Appears stated age, well groomed.  Physical Exam  Constitutional:       Appearance: Normal appearance. She is obese.   HENT:      Head: Normocephalic and atraumatic.   Eyes:      Pupils: Pupils are equal, round, and reactive to light.   Cardiovascular:      Rate and Rhythm: Normal rate and regular rhythm.      Pulses: Normal pulses.      Heart sounds: Normal heart sounds. No murmur heard.  Pulmonary:      Effort: Pulmonary effort is normal. No respiratory distress.      Breath sounds: Normal breath sounds. No wheezing or rhonchi.   Skin:     Findings: No rash.   Neurological:      General: No focal deficit present.      Mental Status: She is alert and oriented to person, place, and time.          ASSESSMENT AND PLAN:   1. Essential  hypertension  Stable, CPM.   - lisinopril-hydroCHLOROthiazide 20-12.5 MG Oral Tab; Take 1 tablet by mouth daily.  Dispense: 90 tablet; Refill: 1    2. Obstructive sleep apnea syndrome  On CPAP therapy, CPM. Following with [facility].     3. Anxiety and depression  Symptomatic, will adjust wellbutrin. Consider therapy. F/u 3mo or sooner prn. Reviewed return precautions.   - buPROPion  MG Oral Tablet 24 Hr; Take 1 tablet (300 mg total) by mouth daily.  Dispense: 90 tablet; Refill: 1    4. Class 3 severe obesity due to excess calories with serious comorbidity and body mass index (BMI) of 40.0 to 44.9 in adult (HCC)  Will continue phentermine for now. Also on wellbutrin. Advised on daily exercise, healthy well balanced diet. F/u 3mo.   - Phentermine HCl 37.5 MG Oral Tab; Take 1 tablet (37.5 mg total) by mouth every morning before breakfast.  Dispense: 30 tablet; Refill: 2      Meds & Refills for this Visit:  Requested Prescriptions     Signed Prescriptions Disp Refills    lisinopril-hydroCHLOROthiazide 20-12.5 MG Oral Tab 90 tablet 1     Sig: Take 1 tablet by mouth daily.    buPROPion  MG Oral Tablet 24 Hr 90 tablet 1     Sig: Take 1 tablet (300 mg total) by mouth daily.    Phentermine HCl 37.5 MG Oral Tab 30 tablet 2     Sig: Take 1 tablet (37.5 mg total) by mouth every morning before breakfast.       Health Maintenance:  Health Maintenance Due   Topic Date Due    Mammogram  Never done    Pap Smear  Never done    COVID-19 Vaccine (4 - 2023-24 season) 09/01/2023       Patient/Caregiver Education: Patient/Caregiver Education: There are no barriers to learning. Medical education done.   Outcome: Patient verbalizes understanding. Patient is notified to call with any questions, complications, allergies, or worsening or changing symptoms.  Patient is to call with any side effects or complications from the treatments as a result of today.     Problem List:  Patient Active Problem List   Diagnosis     Essential hypertension

## 2024-09-24 ENCOUNTER — HOSPITAL ENCOUNTER (OUTPATIENT)
Dept: MAMMOGRAPHY | Age: 39
Discharge: HOME | End: 2024-09-24
Attending: FAMILY MEDICINE
Payer: COMMERCIAL

## 2024-09-24 DIAGNOSIS — Z12.31 ENCOUNTER FOR SCREENING MAMMOGRAM FOR MALIGNANT NEOPLASM OF BREAST: ICD-10-CM

## 2024-09-24 PROCEDURE — 77067 SCR MAMMO BI INCL CAD: CPT | Performed by: FAMILY MEDICINE

## 2024-09-24 PROCEDURE — 77063 BREAST TOMOSYNTHESIS BI: CPT | Performed by: FAMILY MEDICINE

## 2024-11-06 PROBLEM — D35.2 PITUITARY MICROADENOMA (HCC): Status: ACTIVE | Noted: 2023-04-21

## 2024-11-06 PROBLEM — E22.1 HYPERPROLACTINEMIA (HCC): Status: ACTIVE | Noted: 2024-11-06

## 2024-11-06 PROBLEM — F41.8 MIXED ANXIETY AND DEPRESSIVE DISORDER: Status: ACTIVE | Noted: 2019-08-06

## 2024-11-06 PROBLEM — I83.90 VENOUS VARICES: Status: ACTIVE | Noted: 2024-11-06

## 2024-11-06 PROBLEM — E66.01 MORBID OBESITY (HCC): Status: ACTIVE | Noted: 2019-08-06

## 2024-11-06 PROBLEM — O24.419 GESTATIONAL DIABETES MELLITUS (HCC): Status: ACTIVE | Noted: 2021-07-14

## 2024-11-06 PROBLEM — D64.9 ANEMIA: Status: ACTIVE | Noted: 2019-08-06

## 2024-11-07 ENCOUNTER — OFFICE VISIT (OUTPATIENT)
Dept: FAMILY MEDICINE CLINIC | Facility: CLINIC | Age: 39
End: 2024-11-07
Payer: COMMERCIAL

## 2024-11-07 ENCOUNTER — LAB ENCOUNTER (OUTPATIENT)
Dept: LAB | Age: 39
End: 2024-11-07
Attending: FAMILY MEDICINE
Payer: COMMERCIAL

## 2024-11-07 VITALS
HEART RATE: 101 BPM | RESPIRATION RATE: 18 BRPM | HEIGHT: 67 IN | WEIGHT: 255 LBS | OXYGEN SATURATION: 98 % | DIASTOLIC BLOOD PRESSURE: 78 MMHG | SYSTOLIC BLOOD PRESSURE: 128 MMHG | BODY MASS INDEX: 40.02 KG/M2

## 2024-11-07 DIAGNOSIS — E66.01 CLASS 2 SEVERE OBESITY DUE TO EXCESS CALORIES WITH SERIOUS COMORBIDITY AND BODY MASS INDEX (BMI) OF 39.0 TO 39.9 IN ADULT (HCC): ICD-10-CM

## 2024-11-07 DIAGNOSIS — G47.33 OBSTRUCTIVE SLEEP APNEA SYNDROME: ICD-10-CM

## 2024-11-07 DIAGNOSIS — I10 ESSENTIAL HYPERTENSION: Primary | ICD-10-CM

## 2024-11-07 DIAGNOSIS — R79.89 ELEVATED SERUM CREATININE: ICD-10-CM

## 2024-11-07 DIAGNOSIS — E66.812 CLASS 2 SEVERE OBESITY DUE TO EXCESS CALORIES WITH SERIOUS COMORBIDITY AND BODY MASS INDEX (BMI) OF 39.0 TO 39.9 IN ADULT (HCC): ICD-10-CM

## 2024-11-07 DIAGNOSIS — F41.9 ANXIETY AND DEPRESSION: ICD-10-CM

## 2024-11-07 DIAGNOSIS — F32.A ANXIETY AND DEPRESSION: ICD-10-CM

## 2024-11-07 PROBLEM — O24.419 GESTATIONAL DIABETES MELLITUS (HCC): Status: RESOLVED | Noted: 2021-07-14 | Resolved: 2024-11-07

## 2024-11-07 LAB
ANION GAP SERPL CALC-SCNC: 5 MMOL/L (ref 0–18)
BUN BLD-MCNC: 14 MG/DL (ref 9–23)
CALCIUM BLD-MCNC: 10 MG/DL (ref 8.7–10.4)
CHLORIDE SERPL-SCNC: 102 MMOL/L (ref 98–112)
CO2 SERPL-SCNC: 30 MMOL/L (ref 21–32)
CREAT BLD-MCNC: 1.08 MG/DL
EGFRCR SERPLBLD CKD-EPI 2021: 66 ML/MIN/1.73M2 (ref 60–?)
FASTING STATUS PATIENT QL REPORTED: YES
GLUCOSE BLD-MCNC: 88 MG/DL (ref 70–99)
OSMOLALITY SERPL CALC.SUM OF ELEC: 284 MOSM/KG (ref 275–295)
POTASSIUM SERPL-SCNC: 3.9 MMOL/L (ref 3.5–5.1)
SODIUM SERPL-SCNC: 137 MMOL/L (ref 136–145)

## 2024-11-07 PROCEDURE — 99214 OFFICE O/P EST MOD 30 MIN: CPT | Performed by: FAMILY MEDICINE

## 2024-11-07 PROCEDURE — 3008F BODY MASS INDEX DOCD: CPT | Performed by: FAMILY MEDICINE

## 2024-11-07 PROCEDURE — 80048 BASIC METABOLIC PNL TOTAL CA: CPT | Performed by: FAMILY MEDICINE

## 2024-11-07 PROCEDURE — 3074F SYST BP LT 130 MM HG: CPT | Performed by: FAMILY MEDICINE

## 2024-11-07 PROCEDURE — 3078F DIAST BP <80 MM HG: CPT | Performed by: FAMILY MEDICINE

## 2024-11-07 RX ORDER — PHENTERMINE HYDROCHLORIDE 37.5 MG/1
37.5 TABLET ORAL
Qty: 30 TABLET | Refills: 5 | Status: SHIPPED | OUTPATIENT
Start: 2024-11-07

## 2024-11-07 NOTE — PROGRESS NOTES
[facility] Family Medicine Office Note  Chief Complaint:   Chief Complaint   Patient presents with    Hypertension       HPI:   This is a 39 year old female coming in for follow up.     Obesity - has lost 17 lbs since last appt. She is on phentermine 37.5mg. She is tolerating well w/o side effects. She denies any chest pains, palpitations, dyspnea, LE swelling. She has been watching her diet, following reduced calorie diet. She has been trying to exercise regularly.     Anxiety/depression - doing well w/ wellbutrin. She does feel more irritable at times with her children. She does not see therapy. No HI/SI.     YUDITH - stable on CPAP. Tolerating well w/o complications.       HTN - compliant with medications. Denies any symptoms or side effects.       Past Medical History:    Allergic rhinitis    Anxiety    Essential hypertension    Gestational diabetes mellitus (HCC)    Obesity     Past Surgical History:   Procedure Laterality Date       and     Twins and 1      2017     Social History:  Social History     Socioeconomic History    Marital status: Single   Tobacco Use    Smoking status: Former     Current packs/day: 0.00     Types: Cigarettes     Quit date: 3/1/2016     Years since quittin.6    Smokeless tobacco: Never   Vaping Use    Vaping status: Never Used   Substance and Sexual Activity    Alcohol use: Yes     Alcohol/week: 1.0 standard drink of alcohol     Types: 1 Cans of beer per week     Comment: socially    Drug use: Never   Other Topics Concern    Caffeine Concern Yes    Exercise No    Seat Belt Yes    Special Diet Yes    Stress Concern Yes    Weight Concern Yes     Social Drivers of Health      [facility]     Family History:  Family History   Problem Relation Age of Onset    Cancer Mother         myeloma    Cancer Maternal Grandmother         Throat cancer    Dementia Maternal Grandfather         Dementia passed in       Allergies:  Allergies[1]  Current Meds:  Current Outpatient Medications   Medication Sig Dispense Refill    lisinopril-hydroCHLOROthiazide 20-12.5 MG Oral Tab Take 1 tablet by mouth daily. 90 tablet 1    buPROPion  MG Oral Tablet 24 Hr Take 1 tablet (300 mg total) by mouth daily. 90 tablet 1    Phentermine HCl 37.5 MG Oral Tab Take 1 tablet (37.5 mg total) by mouth every morning before breakfast. 30 tablet 2    albuterol (PROAIR HFA) 108 (90 Base) MCG/ACT Inhalation Aero Soln Inhale 2 puffs into the lungs every 4 (four) hours as needed. 1 each 0    fluticasone propionate 50 MCG/ACT Nasal Suspension 2 sprays by Nasal route as needed for Allergies or Rhinitis.      albuterol 108 (90 Base) MCG/ACT Inhalation Aero Soln INHALE 2 PUFFS EVERY 4-6 HOURS BY INHALATION ROUTE AS NEEDED.      ALPRAZolam 0.25 MG Oral Tab Take by mouth.        Counseling given: Not Answered       REVIEW OF SYSTEMS:   Review of Systems   Constitutional: Negative.    HENT: Negative.     Eyes: Negative.    Respiratory: Negative.     Cardiovascular: Negative.    Gastrointestinal: Negative.    Endocrine: Negative.    Genitourinary: Negative.    Musculoskeletal: Negative.    Skin: Negative.    Neurological: Negative.    Psychiatric/Behavioral: Negative.          EXAM:   /78   Pulse 101   Resp 18   Ht 5' 7\" (1.702 m)   Wt 255 lb (115.7 kg)   LMP 10/11/2024 (Exact Date)   SpO2 98%   BMI 39.94 kg/m²  Estimated body mass index is 39.94 kg/m² as calculated from the following:    Height as of this encounter: 5' 7\" (1.702 m).    Weight as of this encounter: 255 lb (115.7 kg).   Vital signs reviewed.Appears stated age, well groomed.  Physical Exam  Vitals and nursing note reviewed.   Constitutional:       Appearance: Normal appearance. She is obese.   HENT:      Head: Normocephalic and atraumatic.   Eyes:      Pupils: Pupils are equal, round, and reactive to light.   Cardiovascular:      Rate and Rhythm: Normal rate and regular rhythm.       Pulses: Normal pulses.      Heart sounds: Normal heart sounds. No murmur heard.  Pulmonary:      Effort: Pulmonary effort is normal. No respiratory distress.      Breath sounds: Normal breath sounds. No wheezing or rhonchi.   Skin:     Findings: No rash.   Neurological:      General: No focal deficit present.      Mental Status: She is alert and oriented to person, place, and time.          ASSESSMENT AND PLAN:   1. Essential hypertension  Stable, CPM.  Continue low sodium diet, regular exercise, monitor BP at home goal <140/90. F/u 6mo    2. Anxiety and depression  Will add on [facility] referral for counseling. Continue current doses of wellbutrin, alprazolam prn. Consider medication adjustment if needed. F/u 6mo or sooner prn.   - [facility] Referral - [facility]    3. Class 2 severe obesity due to excess calories with serious comorbidity and body mass index (BMI) of 39.0 to 39.9 in adult (HCC)  Good progress, will continue present mgmt.   Continue diet, exercise recommendations.   Continue phentermine.  Follow 150 min moderate intensity exercise weekly, aim for 500cal daily calorie deficit.   Reviewed medication administration, side effects.   F/u 6mo    - Phentermine HCl 37.5 MG Oral Tab; Take 1 tablet (37.5 mg total) by mouth every morning before breakfast.  Dispense: 30 tablet; Refill: 5    4. Obstructive sleep apnea syndrome  Stable, CPM.       Meds & Refills for this Visit:  Requested Prescriptions     Pending Prescriptions Disp Refills    Phentermine HCl 37.5 MG Oral Tab 30 tablet 5     Sig: Take 1 tablet (37.5 mg total) by mouth every morning before breakfast.       Health Maintenance:  Health Maintenance Due   Topic Date Due    Pap Smear  Never done    COVID-19 Vaccine (4 - 2023-24 season) 09/01/2024    Influenza Vaccine (1) 10/01/2024       Patient/Caregiver Education: Patient/Caregiver Education: There are no barriers to learning. Medical education done.   Outcome: Patient verbalizes understanding. Patient  is notified to call with any questions, complications, allergies, or worsening or changing symptoms.  Patient is to call with any side effects or complications from the treatments as a result of today.     Problem List:  Patient Active Problem List   Diagnosis    Essential hypertension    Anemia    Anxiety state    Disorder of pigmentation    Diverticular disease of colon    Hyperprolactinemia (HCC)    Mixed anxiety and depressive disorder    Morbid obesity (HCC)    Pituitary microadenoma (HCC)    Polycystic ovaries    Venous varices    Vitiligo    Obstructive sleep apnea syndrome          [1] No Known Allergies

## 2024-12-30 ENCOUNTER — IMMUNIZATION (OUTPATIENT)
Dept: FAMILY MEDICINE CLINIC | Facility: CLINIC | Age: 39
End: 2024-12-30
Payer: COMMERCIAL

## 2024-12-30 DIAGNOSIS — Z23 NEED FOR VACCINATION: Primary | ICD-10-CM

## 2024-12-30 PROCEDURE — 90656 IIV3 VACC NO PRSV 0.5 ML IM: CPT | Performed by: FAMILY MEDICINE

## 2024-12-30 PROCEDURE — 90471 IMMUNIZATION ADMIN: CPT | Performed by: FAMILY MEDICINE

## 2025-01-22 DIAGNOSIS — I10 ESSENTIAL HYPERTENSION: ICD-10-CM

## 2025-01-22 DIAGNOSIS — F41.9 ANXIETY AND DEPRESSION: ICD-10-CM

## 2025-01-22 DIAGNOSIS — F32.A ANXIETY AND DEPRESSION: ICD-10-CM

## 2025-01-22 RX ORDER — LISINOPRIL AND HYDROCHLOROTHIAZIDE 12.5; 2 MG/1; MG/1
1 TABLET ORAL DAILY
Qty: 90 TABLET | Refills: 1 | Status: SHIPPED | OUTPATIENT
Start: 2025-01-22

## 2025-01-22 RX ORDER — BUPROPION HYDROCHLORIDE 300 MG/1
300 TABLET ORAL DAILY
Qty: 90 TABLET | Refills: 1 | Status: SHIPPED | OUTPATIENT
Start: 2025-01-22

## 2025-01-30 ENCOUNTER — OFFICE VISIT (OUTPATIENT)
Dept: FAMILY MEDICINE CLINIC | Facility: CLINIC | Age: 40
End: 2025-01-30
Payer: COMMERCIAL

## 2025-01-30 VITALS
WEIGHT: 251 LBS | SYSTOLIC BLOOD PRESSURE: 128 MMHG | OXYGEN SATURATION: 98 % | BODY MASS INDEX: 39.39 KG/M2 | HEART RATE: 90 BPM | DIASTOLIC BLOOD PRESSURE: 78 MMHG | HEIGHT: 67 IN

## 2025-01-30 DIAGNOSIS — Z02.89 ENCOUNTER FOR PHYSICAL EXAMINATION RELATED TO EMPLOYMENT: ICD-10-CM

## 2025-01-30 DIAGNOSIS — Z00.00 LABORATORY EXAMINATION ORDERED AS PART OF A ROUTINE GENERAL MEDICAL EXAMINATION: ICD-10-CM

## 2025-01-30 DIAGNOSIS — Z11.1 SCREENING-PULMONARY TB: ICD-10-CM

## 2025-01-30 DIAGNOSIS — I10 ESSENTIAL HYPERTENSION: Primary | ICD-10-CM

## 2025-01-30 DIAGNOSIS — F41.8 MIXED ANXIETY AND DEPRESSIVE DISORDER: ICD-10-CM

## 2025-01-30 PROCEDURE — 3074F SYST BP LT 130 MM HG: CPT | Performed by: FAMILY MEDICINE

## 2025-01-30 PROCEDURE — 3078F DIAST BP <80 MM HG: CPT | Performed by: FAMILY MEDICINE

## 2025-01-30 PROCEDURE — 3008F BODY MASS INDEX DOCD: CPT | Performed by: FAMILY MEDICINE

## 2025-01-30 PROCEDURE — 99213 OFFICE O/P EST LOW 20 MIN: CPT | Performed by: FAMILY MEDICINE

## 2025-01-30 NOTE — PROGRESS NOTES
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     Immunization/Injection     MMR and TB        HPI:   This is a 39 year old female coming in for follow of chronic conditions, employment physical.     HTN - compliant with medication. Denies any symptoms.     Anxiety/depression - stable on medication.     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 She needs employment physical form completed. She has no difficulty with lifting, carrying, pushing, pulling.     Past Medical History:    Allergic rhinitis    Anxiety    Essential hypertension    Gestational diabetes mellitus (HCC)    Obesity     Past Surgical History:   Procedure Laterality Date       and     Twins and 1      2017     Social History:  Social History     Socioeconomic History    Marital status: Single   Tobacco Use    Smoking status: Former     Current packs/day: 0.00     Types: Cigarettes     Quit date: 3/1/2016     Years since quittin.9    Smokeless tobacco: Never   Vaping Use    Vaping status: Never Used   Substance and Sexual Activity    Alcohol use: Yes     Alcohol/week: 1.0 standard drink of alcohol     Types: 1 Cans of beer per week     Comment: socially    Drug use: Never   Other Topics Concern    Caffeine Concern Yes    Exercise No    Seat Belt Yes    Special Diet Yes    Stress Concern Yes    Weight Concern Yes     Social Drivers of Health      [facility]     Family History:  Family History   Problem Relation Age of Onset    Cancer Mother         myeloma    Cancer Maternal Grandmother         Throat cancer    Dementia Maternal Grandfather         Dementia passed in      Allergies:  Allergies[1]  Current Meds:  Current Outpatient Medications   Medication Sig Dispense Refill    BUPROPION  MG Oral Tablet 24 Hr TAKE 1 TABLET BY  MOUTH EVERY DAY 90 tablet 1    LISINOPRIL-HYDROCHLOROTHIAZIDE 20-12.5 MG Oral Tab TAKE 1 TABLET BY MOUTH EVERY DAY 90 tablet 1    Phentermine HCl 37.5 MG Oral Tab Take 1 tablet (37.5 mg total) by mouth every morning before breakfast. 30 tablet 5    albuterol (PROAIR HFA) 108 (90 Base) MCG/ACT Inhalation Aero Soln Inhale 2 puffs into the lungs every 4 (four) hours as needed. 1 each 0    fluticasone propionate 50 MCG/ACT Nasal Suspension 2 sprays by Nasal route as needed for Allergies or Rhinitis.      albuterol 108 (90 Base) MCG/ACT Inhalation Aero Soln INHALE 2 PUFFS EVERY 4-6 HOURS BY INHALATION ROUTE AS NEEDED.      ALPRAZolam 0.25 MG Oral Tab Take by mouth.        Counseling given: Not Answered       REVIEW OF SYSTEMS:   Review of Systems   Constitutional: Negative.    HENT: Negative.     Eyes: Negative.    Respiratory: Negative.     Cardiovascular: Negative.    Gastrointestinal: Negative.    Endocrine: Negative.    Genitourinary: Negative.    Musculoskeletal: Negative.    Skin: Negative.    Neurological: Negative.    Psychiatric/Behavioral: Negative.          EXAM:   /78   Pulse 90   Ht 5' 7\" (1.702 m)   Wt 251 lb (113.9 kg)   LMP 01/08/2025 (Exact Date)   SpO2 98%   BMI 39.31 kg/m²  Estimated body mass index is 39.31 kg/m² as calculated from the following:    Height as of this encounter: 5' 7\" (1.702 m).    Weight as of this encounter: 251 lb (113.9 kg).   Vital signs reviewed.Appears stated age, well groomed.  Physical Exam  Constitutional:       Appearance: Normal appearance.   HENT:      Head: Normocephalic and atraumatic.   Eyes:      Pupils: Pupils are equal, round, and reactive to light.   Cardiovascular:      Rate and Rhythm: Normal rate and regular rhythm.      Pulses: Normal pulses.      Heart sounds: Normal heart sounds. No murmur heard.  Pulmonary:      Effort: Pulmonary effort is normal. No respiratory distress.      Breath sounds: Normal breath sounds. No wheezing or rhonchi.    Abdominal:      General: Abdomen is flat. Bowel sounds are normal. There is no distension.      Palpations: Abdomen is soft. There is no mass.      Tenderness: There is no abdominal tenderness.      Hernia: No hernia is present.   Musculoskeletal:         General: Normal range of motion.      Right lower leg: No edema.      Left lower leg: No edema.   Skin:     Findings: No rash.   Neurological:      General: No focal deficit present.      Mental Status: She is alert and oriented to person, place, and time.          ASSESSMENT AND PLAN:   1. Essential hypertension  Stable, CPM. Due for labs. Continue low sodium diet, regular exercise, monitor BP at home goal <140/90. F/u 6mo    2. Mixed anxiety and depressive disorder  Stable, CPM    3. Encounter for physical examination related to employment  Will check TB, MMR titers.   Completed physical form - pending orders as above.  - Quantiferon TB Plus; Future  - Immune Status Panel,MMR; Future    4. Laboratory examination ordered as part of a routine general medical examination  - CBC With Differential With Platelet; Future  - Comp Metabolic Panel (14); Future  - Lipid Panel; Future  - TSH W Reflex To Free T4; Future    5. Screening-pulmonary TB  See #3    [place] [facility] [place]    Meds & Refills for this Visit:  Requested Prescriptions      No prescriptions requested or ordered in this encounter       Health Maintenance:  Health Maintenance Due   Topic Date Due    COVID-19 Vaccine (4 - 2024-25 season) 09/01/2024    Annual Depression Screening  01/01/2025    Pap Smear  02/22/2025    Annual Physical  03/21/2025       Patient/Caregiver Education: Patient/Caregiver Education: There are no barriers to learning. Medical education done.   Outcome: Patient verbalizes understanding. Patient is notified to call with any questions, complications, allergies, or worsening or changing symptoms.  Patient is to call with any side effects or complications from the treatments as a  result of today.     Problem List:  Patient Active Problem List   Diagnosis    Essential hypertension    Anemia    Anxiety state    Disorder of pigmentation    Diverticular disease of colon    Hyperprolactinemia (HCC)    Mixed anxiety and depressive disorder    Morbid obesity (HCC)    Pituitary microadenoma (HCC)    Polycystic ovaries    Venous varices    Vitiligo    Obstructive sleep apnea syndrome          [1] No Known Allergies

## 2025-01-31 ENCOUNTER — LAB ENCOUNTER (OUTPATIENT)
Dept: LAB | Age: 40
End: 2025-01-31
Attending: FAMILY MEDICINE
Payer: COMMERCIAL

## 2025-01-31 DIAGNOSIS — Z00.00 LABORATORY EXAMINATION ORDERED AS PART OF A ROUTINE GENERAL MEDICAL EXAMINATION: ICD-10-CM

## 2025-01-31 DIAGNOSIS — Z02.89 ENCOUNTER FOR PHYSICAL EXAMINATION RELATED TO EMPLOYMENT: ICD-10-CM

## 2025-01-31 DIAGNOSIS — Z11.1 SCREENING-PULMONARY TB: ICD-10-CM

## 2025-01-31 LAB
ALBUMIN SERPL-MCNC: 4.8 G/DL (ref 3.2–4.8)
ALBUMIN/GLOB SERPL: 1.6 {RATIO} (ref 1–2)
ALP LIVER SERPL-CCNC: 66 U/L
ALT SERPL-CCNC: 19 U/L
ANION GAP SERPL CALC-SCNC: 9 MMOL/L (ref 0–18)
AST SERPL-CCNC: 17 U/L (ref ?–34)
BASOPHILS # BLD AUTO: 0.05 X10(3) UL (ref 0–0.2)
BASOPHILS NFR BLD AUTO: 0.8 %
BILIRUB SERPL-MCNC: 0.6 MG/DL (ref 0.3–1.2)
BUN BLD-MCNC: 16 MG/DL (ref 9–23)
CALCIUM BLD-MCNC: 9.6 MG/DL (ref 8.7–10.6)
CHLORIDE SERPL-SCNC: 101 MMOL/L (ref 98–112)
CHOLEST SERPL-MCNC: 168 MG/DL (ref ?–200)
CO2 SERPL-SCNC: 28 MMOL/L (ref 21–32)
CREAT BLD-MCNC: 1.06 MG/DL
EGFRCR SERPLBLD CKD-EPI 2021: 68 ML/MIN/1.73M2 (ref 60–?)
EOSINOPHIL # BLD AUTO: 0.13 X10(3) UL (ref 0–0.7)
EOSINOPHIL NFR BLD AUTO: 2.1 %
ERYTHROCYTE [DISTWIDTH] IN BLOOD BY AUTOMATED COUNT: 14 %
FASTING PATIENT LIPID ANSWER: YES
FASTING STATUS PATIENT QL REPORTED: YES
GLOBULIN PLAS-MCNC: 3 G/DL (ref 2–3.5)
GLUCOSE BLD-MCNC: 83 MG/DL (ref 70–99)
HCT VFR BLD AUTO: 39.2 %
HDLC SERPL-MCNC: 48 MG/DL (ref 40–59)
HGB BLD-MCNC: 12.7 G/DL
IMM GRANULOCYTES # BLD AUTO: 0.01 X10(3) UL (ref 0–1)
IMM GRANULOCYTES NFR BLD: 0.2 %
LDLC SERPL CALC-MCNC: 106 MG/DL (ref ?–100)
LYMPHOCYTES # BLD AUTO: 2.3 X10(3) UL (ref 1–4)
LYMPHOCYTES NFR BLD AUTO: 36.5 %
MCH RBC QN AUTO: 28.3 PG (ref 26–34)
MCHC RBC AUTO-ENTMCNC: 32.4 G/DL (ref 31–37)
MCV RBC AUTO: 87.3 FL
MONOCYTES # BLD AUTO: 0.5 X10(3) UL (ref 0.1–1)
MONOCYTES NFR BLD AUTO: 7.9 %
NEUTROPHILS # BLD AUTO: 3.32 X10 (3) UL (ref 1.5–7.7)
NEUTROPHILS # BLD AUTO: 3.32 X10(3) UL (ref 1.5–7.7)
NEUTROPHILS NFR BLD AUTO: 52.5 %
NONHDLC SERPL-MCNC: 120 MG/DL (ref ?–130)
OSMOLALITY SERPL CALC.SUM OF ELEC: 286 MOSM/KG (ref 275–295)
PLATELET # BLD AUTO: 253 10(3)UL (ref 150–450)
POTASSIUM SERPL-SCNC: 4.1 MMOL/L (ref 3.5–5.1)
PROT SERPL-MCNC: 7.8 G/DL (ref 5.7–8.2)
RBC # BLD AUTO: 4.49 X10(6)UL
RUBV IGG SER QL: POSITIVE
RUBV IGG SER-ACNC: 10 IU/ML (ref 10–?)
SODIUM SERPL-SCNC: 138 MMOL/L (ref 136–145)
TRIGL SERPL-MCNC: 74 MG/DL (ref 30–149)
TSI SER-ACNC: 2.83 UIU/ML (ref 0.55–4.78)
VLDLC SERPL CALC-MCNC: 12 MG/DL (ref 0–30)
WBC # BLD AUTO: 6.3 X10(3) UL (ref 4–11)

## 2025-01-31 PROCEDURE — 86480 TB TEST CELL IMMUN MEASURE: CPT | Performed by: FAMILY MEDICINE

## 2025-01-31 PROCEDURE — 80061 LIPID PANEL: CPT | Performed by: FAMILY MEDICINE

## 2025-01-31 PROCEDURE — 80050 GENERAL HEALTH PANEL: CPT | Performed by: FAMILY MEDICINE

## 2025-01-31 PROCEDURE — 86765 RUBEOLA ANTIBODY: CPT | Performed by: FAMILY MEDICINE

## 2025-01-31 PROCEDURE — 86735 MUMPS ANTIBODY: CPT | Performed by: FAMILY MEDICINE

## 2025-01-31 PROCEDURE — 86762 RUBELLA ANTIBODY: CPT | Performed by: FAMILY MEDICINE

## 2025-02-03 LAB
M TB IFN-G CD4+ T-CELLS BLD-ACNC: 0.04 IU/ML
M TB TUBERC IFN-G BLD QL: NEGATIVE
M TB TUBERC IGNF/MITOGEN IGNF CONTROL: >10 IU/ML
MEV IGG SER-ACNC: 109 AU/ML (ref 16.5–?)
MUV IGG SER IA-ACNC: <5 AU/ML (ref 11–?)
QFT TB1 AG MINUS NIL: 0 IU/ML
QFT TB2 AG MINUS NIL: -0.01 IU/ML

## 2025-02-05 ENCOUNTER — NURSE ONLY (OUTPATIENT)
Dept: FAMILY MEDICINE CLINIC | Facility: CLINIC | Age: 40
End: 2025-02-05
Payer: COMMERCIAL

## 2025-02-05 DIAGNOSIS — Z23 NEED FOR MMR VACCINE: Primary | ICD-10-CM

## 2025-02-05 PROCEDURE — 90707 MMR VACCINE SC: CPT

## 2025-02-05 PROCEDURE — 90471 IMMUNIZATION ADMIN: CPT

## 2025-03-05 ENCOUNTER — NURSE ONLY (OUTPATIENT)
Dept: FAMILY MEDICINE CLINIC | Facility: CLINIC | Age: 40
End: 2025-03-05
Payer: COMMERCIAL

## 2025-03-05 DIAGNOSIS — Z23 NEED FOR MMR VACCINE: Primary | ICD-10-CM

## 2025-03-05 PROCEDURE — 90471 IMMUNIZATION ADMIN: CPT

## 2025-03-05 PROCEDURE — 90707 MMR VACCINE SC: CPT

## 2025-05-08 ENCOUNTER — LAB ENCOUNTER (OUTPATIENT)
Dept: LAB | Age: 40
End: 2025-05-08
Attending: FAMILY MEDICINE
Payer: COMMERCIAL

## 2025-05-08 ENCOUNTER — OFFICE VISIT (OUTPATIENT)
Dept: FAMILY MEDICINE CLINIC | Facility: CLINIC | Age: 40
End: 2025-05-08
Payer: COMMERCIAL

## 2025-05-08 VITALS
SYSTOLIC BLOOD PRESSURE: 128 MMHG | HEART RATE: 82 BPM | BODY MASS INDEX: 39.55 KG/M2 | RESPIRATION RATE: 18 BRPM | OXYGEN SATURATION: 97 % | DIASTOLIC BLOOD PRESSURE: 72 MMHG | WEIGHT: 252 LBS | HEIGHT: 67 IN

## 2025-05-08 DIAGNOSIS — D35.2 PITUITARY MICROADENOMA (HCC): ICD-10-CM

## 2025-05-08 DIAGNOSIS — J45.20 MILD INTERMITTENT EXTRINSIC ASTHMA WITHOUT COMPLICATION (HCC): ICD-10-CM

## 2025-05-08 DIAGNOSIS — I10 ESSENTIAL HYPERTENSION: ICD-10-CM

## 2025-05-08 DIAGNOSIS — E66.812 CLASS 2 SEVERE OBESITY DUE TO EXCESS CALORIES WITH SERIOUS COMORBIDITY AND BODY MASS INDEX (BMI) OF 39.0 TO 39.9 IN ADULT (HCC): ICD-10-CM

## 2025-05-08 DIAGNOSIS — E66.01 CLASS 2 SEVERE OBESITY DUE TO EXCESS CALORIES WITH SERIOUS COMORBIDITY AND BODY MASS INDEX (BMI) OF 39.0 TO 39.9 IN ADULT (HCC): ICD-10-CM

## 2025-05-08 DIAGNOSIS — F41.8 MIXED ANXIETY AND DEPRESSIVE DISORDER: ICD-10-CM

## 2025-05-08 DIAGNOSIS — E22.1 HYPERPROLACTINEMIA (HCC): ICD-10-CM

## 2025-05-08 DIAGNOSIS — Z00.00 WELLNESS EXAMINATION: Primary | ICD-10-CM

## 2025-05-08 DIAGNOSIS — G47.33 OBSTRUCTIVE SLEEP APNEA SYNDROME: ICD-10-CM

## 2025-05-08 PROBLEM — J45.909 EXTRINSIC ASTHMA (HCC): Status: ACTIVE | Noted: 2025-05-08

## 2025-05-08 LAB
EST. AVERAGE GLUCOSE BLD GHB EST-MCNC: 94 MG/DL (ref 68–126)
HBA1C MFR BLD: 4.9 % (ref ?–5.7)

## 2025-05-08 PROCEDURE — 3074F SYST BP LT 130 MM HG: CPT | Performed by: FAMILY MEDICINE

## 2025-05-08 PROCEDURE — 99396 PREV VISIT EST AGE 40-64: CPT | Performed by: FAMILY MEDICINE

## 2025-05-08 PROCEDURE — 83036 HEMOGLOBIN GLYCOSYLATED A1C: CPT | Performed by: FAMILY MEDICINE

## 2025-05-08 PROCEDURE — 3078F DIAST BP <80 MM HG: CPT | Performed by: FAMILY MEDICINE

## 2025-05-08 PROCEDURE — 3008F BODY MASS INDEX DOCD: CPT | Performed by: FAMILY MEDICINE

## 2025-05-08 RX ORDER — LISINOPRIL 10 MG/1
1 TABLET ORAL DAILY
COMMUNITY
End: 2025-05-08 | Stop reason: ALTCHOICE

## 2025-05-08 RX ORDER — LABETALOL 100 MG/1
1 TABLET, FILM COATED ORAL 2 TIMES DAILY
COMMUNITY
End: 2025-05-08 | Stop reason: ALTCHOICE

## 2025-05-08 RX ORDER — METRONIDAZOLE 500 MG/1
TABLET ORAL
COMMUNITY
End: 2025-05-08

## 2025-05-08 NOTE — PATIENT INSTRUCTIONS
Health Screening Guidelines, Women Ages 40 to 49   Screening tests are a key to managing your health. A screening test is done to find problems in people who don't have any symptoms. Screening tests are not used to diagnose. They are used to find out if more testing is needed. The goal may be to find a disease early so it can be treated with more success. Or the goal may be to find a disease early so you can make lifestyle changes. You may need regular checkups to help you reduce your risk of disease.   Below are guidelines for women ages 40 to 49. Talk with your healthcare provider to stay up-to-date.   Screening  Who needs it  How often    Type 2 diabetes or prediabetes  All women in this age group  At least every 3 years    Type 2 diabetes All women with prediabetes  Every year   Alcohol misuse All women in this age group  At routine exams   Blood pressure All women in this age group  Once a year if your blood pressure is normal. Normal is less than 120/80 mm Hg. If your blood pressure is higher than this, follow the advice of your healthcare provider.    Breast cancer All women at average risk in this age group. Expert groups vary on their advice. Talk with your provider.  Talk with your healthcare provider to help you decide when to start mammogram screening.   The U.S. Preventive Services Task Force advises mammograms every other year starting at age 40.  The American Cancer Society advises that women ages 40 to 44 have the choice to start yearly mammograms. They advise yearly mammograms for women ages 45 to 54.  All women should know how their breasts normally look and feel. stop    Screening Who needs it How often   Cervical cancer All women in this age group, unless they have had a complete hysterectomy  Pap test every 3 years or Pap test and HPV test every 5 years    Colorectal cancer Women age 45 years and older at average risk  Talk with your provider about which test is right for you:   Flexible  sigmoidoscopy every 5 years  Colonoscopy every 10 years  CT colonography (virtual colonoscopy) every 5 years  Yearly fecal occult blood test  Yearly fecal immunochemical test (FIT)  Stool DNA test every 3 years  If you have a test that is not a colonoscopy and have an abnormal test result, you will need a colonoscopy.   You may need to be screened more or less often. This is based on personal or family health history. Talk with your provider.    Screening Who needs it How often   Chlamydia Women at higher risk  At routine exams if you're at risk or have symptoms    Depression All women in this age group  At routine exams   Gonorrhea Sexually active women at higher risk  At routine exams   Hepatitis C  Women in this age group at higher risk  At routine exams   High cholesterol or triglycerides  All women ages 45 and older who are at risk for coronary artery disease. Younger women, talk with your provider.  At least every 5 years    HIV All women in this age group  At routine exams. Those with risk factors for HIV should be tested at least 1 time a year.    Obesity All women in this age group  At routine exams   Syphilis Women who are at higher risk. Ask your provider.  At routine exams   Tuberculosis Women who are at higher risk  Ask your provider   Vision All women in this age group  Full exam at age 40. Then eye exams every 2 to 4 years. If you have a chronic disease, ask your provider how often you need an eye exam.    Health Counseling  Who needs it  How often    BRCA gene mutation testing for breast and ovarian cancer  Women with higher risk for a gene mutation  When your risk is known    Breast cancer and chemoprevention  Women at high risk for breast cancer  When your risk is known    Diet and exercise Women who are overweight or obese  When diagnosed, and then at routine exams    Domestic violence All women in this age group  At routine exams   Sexually transmitted infection (STI) prevention  Women who are at  higher risk. Talk with your provider.  At routine exams   Use of tobacco  All women in this age group  Every exam   [employer] last reviewed this educational content on 3/1/2024  This information is for informational purposes only. This is not intended to be a substitute for professional medical advice, diagnosis, or treatment. Always seek the advice and follow the directions from your physician or other qualified health care provider.  © 2222-8466 [employer] All rights reserved. This information is not intended as a substitute for professional medical care. Always follow your healthcare professional's instructions.

## 2025-05-08 NOTE — PROGRESS NOTES
Subjective:   Maria Isabel Carney is a 40 year old female who presents for Physical, Hypertension, and Anxiety     History/Other:   History of Present Illness  Maria Isabel Carney is a 40 year old female who presents for annual physical and follow up of chronic conditions.     She has a history of a pituitary microadenoma, first identified in 2016 during IVF treatment. An MRI conducted approximately two years ago showed no changes in the adenoma. She experiences no symptoms such as nipple discharge or vision changes, and her prolactin levels were previously managed with medication during her fertility treatment. She has not experienced any issues related to the adenoma since her last MRI. Denies any symptoms from the associated hyperprolactinemia.     She is currently taking phentermine for weight management but reports no significant weight loss. She has been on phentermine since 03/2024 - starting weight 262. She has a history of gestational diabetes with all her pregnancies and is concerned about her risk for developing diabetes in the future. Her fasting glucose levels have been normal, with the last recorded level being 83 mg/dL in January. She is interested in exploring other weight management options due to her concerns about future health risks.    She has a history of anxiety and depression, which are currently stable on Wellbutrin and Xanax, taken as needed. She also uses albuterol as needed for mild asthma, primarily during seasonal allergies.    Her blood pressure is well-controlled, and she uses a CPAP machine at night, which she finds beneficial. She wants to lose weight to potentially reduce her need for blood pressure medication and CPAP use.   Chief Complaint Reviewed and Verified  No Further Nursing Notes to   Review  Tobacco Reviewed  Allergies Reviewed  Medications Reviewed    Problem List Reviewed  Medical History Reviewed  Surgical History   Reviewed  OB Status Reviewed  Family History  Reviewed  Social History   Reviewed         Tobacco:  She smoked tobacco in the past but quit greater than 12 months ago.  Tobacco Use[1]     Current Medications[2]    PHQ-2 SCORE: 0  , done 5/8/2025          Review of Systems:  Pertinent items are noted in HPI.    Objective:   /72   Pulse 82   Resp 18   Ht 5' 7\" (1.702 m)   Wt 252 lb (114.3 kg)   LMP 04/24/2025 (Exact Date)   SpO2 97%   BMI 39.47 kg/m²  Estimated body mass index is 39.47 kg/m² as calculated from the following:    Height as of this encounter: 5' 7\" (1.702 m).    Weight as of this encounter: 252 lb (114.3 kg).   Physical Exam  GENERAL: Alert, cooperative, well developed, no acute distress.  HEENT: Normocephalic, normal oropharynx, moist mucous membranes.  NECK: Supple, full range of motion.  CHEST: Clear to auscultation bilaterally, no wheezes, rhonchi, or crackles.  CARDIOVASCULAR: Normal heart rate and rhythm, S1 and S2 normal without murmurs.  ABDOMEN: Soft, non-tender, non-distended, without organomegaly, normal bowel sounds.  EXTREMITIES: No cyanosis or edema.  MUSCULOSKELETAL: Shoulders, knees, and hips with normal range of motion.  NEUROLOGICAL: Cranial nerves grossly intact, moves all extremities without gross motor or sensory deficit.      Assessment & Plan:   1. Wellness examination (Primary)  -Immunizations: UTD  -Metabolic: BMI 39. BP wnl. Reviewed annual labs   -Cancer screening: due for pap, has f/u with gyn.  -Communicable disease: low risk   -Mental health: no concerns   -Other preventative: follow with dentistry and optometry.   -Lifestyle: Follow a well balanced healthy diet with emphasis on fruits, vegetables, whole grains, lean meats. Limit processed and junk foods. Aim for at least 150 minutes of moderate intensity exercise weekly. Make sure you are staying adequately hydrated. Aim to get 7-9 hours of sleep nightly.     2. Class 2 severe obesity due to excess calories with serious comorbidity and body mass index  (BMI) of 39.0 to 39.9 in adult (HCC)  No significant improvement with phentermine after 1 year of therapy. Has maximized diet/exercise recommendations. Would recommend to start wegovy. She has associated comorbidity: hypertension.  No contraindications to treatment.   Reviewed medication administration, SE.   Titrate up medication monthly if tolerating well.   F/u 3mo for weight check.     Last BMI: 39.47, Class 2 Obesity.  She was counseled on diet and exercise and pharmacological management.  for elevated BMI which is affecting her hypertension and sleep apnea.    This is a prior authorization request for Wegovy.  This medication is being used for obesity.  This medication will not be concomitantly used with other weight loss medications.  This medication will not be used concomitantly with other GLP-1 agonist.    This is an INITIAL  therapy request.    At baseline the patient had BMI greater than 30 kg/m² .   Maria Isabel Carney has tried and failed at least 3 months of behavioral modification and dietary restrictions.    This medication will concomitantly be used with continued behavior modification reduced calorie diet.      -     Semaglutide-Weight Management; Inject 0.5 mL (0.25 mg total) into the skin once a week for 4 doses.  Dispense: 2 mL; Refill: 0  -     Hemoglobin A1C; Future; Expected date: 05/08/2025    3. Pituitary microadenoma (HCC)  Stable, asymptomatic. CTM.     4. Hyperprolactinemia (HCC)  Asymptomatic. 2/2 pituitary microadenoma. Will monitor.   Bring in OSH records.     5. Mixed anxiety and depressive disorder  Stable, CPM    6. Essential hypertension  Stable, CPM    7. Obstructive sleep apnea syndrome  Stable, CPM    8. Mild intermittent extrinsic asthma without complication (HCC)  Stable, CPM      Return in about 1 year (around 5/8/2026).        Sathish Gaming MD, 5/8/2025, 2:26 PM             [1]   Social History  Tobacco Use   Smoking Status Former    Current packs/day: 0.00    Types:  Cigarettes    Quit date: 3/1/2016    Years since quittin.1   Smokeless Tobacco Never   [2]   Current Outpatient Medications   Medication Sig Dispense Refill    semaglutide-weight management 0.25 MG/0.5ML Subcutaneous Solution Auto-injector Inject 0.5 mL (0.25 mg total) into the skin once a week for 4 doses. 2 mL 0    BUPROPION  MG Oral Tablet 24 Hr TAKE 1 TABLET BY MOUTH EVERY DAY 90 tablet 1    LISINOPRIL-HYDROCHLOROTHIAZIDE 20-12.5 MG Oral Tab TAKE 1 TABLET BY MOUTH EVERY DAY 90 tablet 1    Phentermine HCl 37.5 MG Oral Tab Take 1 tablet (37.5 mg total) by mouth every morning before breakfast. 30 tablet 5    albuterol (PROAIR HFA) 108 (90 Base) MCG/ACT Inhalation Aero Soln Inhale 2 puffs into the lungs every 4 (four) hours as needed. 1 each 0    fluticasone propionate 50 MCG/ACT Nasal Suspension 2 sprays by Nasal route as needed for Allergies or Rhinitis.      ALPRAZolam 0.25 MG Oral Tab Take by mouth.      albuterol 108 (90 Base) MCG/ACT Inhalation Aero Soln INHALE 2 PUFFS EVERY 4-6 HOURS BY INHALATION ROUTE AS NEEDED. (Patient not taking: Reported on 2025)

## 2025-05-08 NOTE — PROGRESS NOTES
The following individual(s) verbally consented to be recorded using ambient AI listening technology and understand that they can each withdraw their consent to this listening technology at any point by asking the clinician to turn off or pause the recording:    Patient name: Maria Isabel MELISSA Carney  Additional names:

## 2025-05-11 DIAGNOSIS — E66.01 CLASS 2 SEVERE OBESITY DUE TO EXCESS CALORIES WITH SERIOUS COMORBIDITY AND BODY MASS INDEX (BMI) OF 39.0 TO 39.9 IN ADULT (HCC): ICD-10-CM

## 2025-05-11 DIAGNOSIS — E66.812 CLASS 2 SEVERE OBESITY DUE TO EXCESS CALORIES WITH SERIOUS COMORBIDITY AND BODY MASS INDEX (BMI) OF 39.0 TO 39.9 IN ADULT (HCC): ICD-10-CM

## 2025-05-12 RX ORDER — PHENTERMINE HYDROCHLORIDE 37.5 MG/1
37.5 TABLET ORAL
Qty: 30 TABLET | Refills: 5 | OUTPATIENT
Start: 2025-05-12

## 2025-05-12 NOTE — TELEPHONE ENCOUNTER
Called patient left  to return call, [facility] sent as well     LOV 5/8/25  2. Class 2 severe obesity due to excess calories with serious comorbidity and body mass index (BMI) of 39.0 to 39.9 in adult (HCC)  No significant improvement with phentermine after 1 year of therapy. Has maximized diet/exercise recommendations. Would recommend to start wegovy. She has associated comorbidity: hypertension.  No contraindications to treatment.   Reviewed medication administration, SE.   Titrate up medication monthly if tolerating well.   F/u 3mo for weight check.

## 2025-05-12 NOTE — TELEPHONE ENCOUNTER
Rx failed [facility] protocol.     Recent fills each # 30 : 2/25/25 , 1/10/25  Last prescription written: 11/7/24  Last office visit:  5/8/2025    No future appointments.     Sent [employer] message to patient the refill request was forwarded to provider.

## 2025-05-13 RX ORDER — PHENTERMINE HYDROCHLORIDE 37.5 MG/1
37.5 TABLET ORAL
Qty: 30 TABLET | Refills: 5 | Status: SHIPPED | OUTPATIENT
Start: 2025-05-13

## 2025-07-26 DIAGNOSIS — I10 ESSENTIAL HYPERTENSION: ICD-10-CM

## 2025-07-26 DIAGNOSIS — F32.A ANXIETY AND DEPRESSION: ICD-10-CM

## 2025-07-26 DIAGNOSIS — F41.9 ANXIETY AND DEPRESSION: ICD-10-CM

## 2025-07-27 RX ORDER — LISINOPRIL AND HYDROCHLOROTHIAZIDE 12.5; 2 MG/1; MG/1
1 TABLET ORAL DAILY
Qty: 90 TABLET | Refills: 3 | Status: SHIPPED | OUTPATIENT
Start: 2025-07-27

## 2025-07-27 RX ORDER — BUPROPION HYDROCHLORIDE 300 MG/1
300 TABLET ORAL DAILY
Qty: 90 TABLET | Refills: 3 | Status: SHIPPED | OUTPATIENT
Start: 2025-07-27

## 2025-07-27 NOTE — TELEPHONE ENCOUNTER
Last OV 5/8/25    Psychiatric Non-Scheduled (Anti-Anxiety) Passed   BUPROPION  MG Oral Tablet 24 Hr [Pharmacy Med Name: BUPROPION HCL  MG TABLET]  7/26/2025  8:21 AM  In person appointment or virtual visit in the past 6 mos or appointment in next 3 mos  Depression Screening completed within the past 12 months  Medication is active on med list    Hypertension Medications Protocol Passed   LISINOPRIL-HYDROCHLOROTHIAZIDE 20-12.5 MG Oral Tab [Pharmacy Med Name: LISINOPRIL-HCTZ 20-12.5 MG TAB]  7/26/2025  8:21 AM  CMP or BMP in past 12 months  Last BP reading less than 140/90  In person appointment or virtual visit in the past 12 mos or appointment in next 3 mos  EGFRCR or GFRNAA > 50  Medication is active on med list